# Patient Record
Sex: MALE | Race: OTHER | Employment: FULL TIME | URBAN - METROPOLITAN AREA
[De-identification: names, ages, dates, MRNs, and addresses within clinical notes are randomized per-mention and may not be internally consistent; named-entity substitution may affect disease eponyms.]

---

## 2024-07-08 ENCOUNTER — OFFICE VISIT (OUTPATIENT)
Dept: URGENT CARE | Facility: CLINIC | Age: 69
End: 2024-07-08
Payer: MEDICARE

## 2024-07-08 VITALS
OXYGEN SATURATION: 96 % | WEIGHT: 193 LBS | BODY MASS INDEX: 25.58 KG/M2 | HEART RATE: 78 BPM | DIASTOLIC BLOOD PRESSURE: 90 MMHG | HEIGHT: 73 IN | TEMPERATURE: 98 F | RESPIRATION RATE: 18 BRPM | SYSTOLIC BLOOD PRESSURE: 157 MMHG

## 2024-07-08 DIAGNOSIS — H53.8 BLURRED VISION: ICD-10-CM

## 2024-07-08 DIAGNOSIS — R42 VERTIGO: ICD-10-CM

## 2024-07-08 DIAGNOSIS — Z79.899 HIGH RISK MEDICATION USE: Primary | ICD-10-CM

## 2024-07-08 LAB — GLUCOSE SERPL-MCNC: 86 MG/DL (ref 70–110)

## 2024-07-08 PROCEDURE — 82962 GLUCOSE BLOOD TEST: CPT | Mod: S$GLB,,, | Performed by: FAMILY MEDICINE

## 2024-07-08 PROCEDURE — 99203 OFFICE O/P NEW LOW 30 MIN: CPT | Mod: S$GLB,,, | Performed by: FAMILY MEDICINE

## 2024-07-08 RX ORDER — METOPROLOL TARTRATE 50 MG/1
50 TABLET ORAL NIGHTLY
COMMUNITY
Start: 2024-05-17

## 2024-07-08 RX ORDER — MECLIZINE HYDROCHLORIDE 25 MG/1
25 TABLET ORAL 3 TIMES DAILY PRN
Qty: 30 TABLET | Refills: 0 | Status: SHIPPED | OUTPATIENT
Start: 2024-07-08

## 2024-07-08 RX ORDER — VALSARTAN AND HYDROCHLOROTHIAZIDE 160; 25 MG/1; MG/1
1 TABLET ORAL
COMMUNITY
Start: 2024-05-17

## 2024-07-08 RX ORDER — METHYLPREDNISOLONE 4 MG/1
TABLET ORAL
Qty: 21 EACH | Refills: 0 | Status: SHIPPED | OUTPATIENT
Start: 2024-07-08 | End: 2024-07-29

## 2024-07-08 NOTE — PROGRESS NOTES
Subjective:      Patient ID: Aly Brar is a 68 y.o. male.    Vitals:  vitals were not taken for this visit.     Chief Complaint: Tinnitus    This is a 68 y.o. male who presents today with a chief complaint of ear fullness onset Saturday. Pt complains of tingling sensation in ears, he states sound is muffled. Pt also is experiencing dizziness and loss of balance. Pt also states vision is blurry and headache in left eye. Pt was on a plane Saturday, when he got off the plane he felt ear fullness, but it never went away. Very slight ear pain. Left ear is worse than right.    Ringing in Ears:   Chronicity:  New  Onset:  In the past 7 days  Progression since onset:  Unchanged  Pain scale:  3/10  Severity:  Moderate  Duration: constant.  Ringing in ear characteristics:  Muffled   Associated symptoms: Dizziness, vertigo, headaches, tinnitus, buzzing, aural fullness and visual disturbances.  No otalgia and no facial weakness.  Aggravated by:  Noise  Treatments tried:  Nothing   PMH includes: Dizziness.  No ear surgery, no ear infections and no recent URI.    HENT:  Positive for tinnitus.    Neurological:  Positive for dizziness, history of vertigo and headaches.    Objective:     Physical Exam   Constitutional: He is oriented to person, place, and time. normal  HENT:   Head: Normocephalic and atraumatic.   Ears:   Right Ear: External ear and ear canal normal. Tympanic membrane is injected and erythematous.   Left Ear: Tympanic membrane, external ear and ear canal normal.   Nose: No rhinorrhea or congestion.   Mouth/Throat: No oropharyngeal exudate.   Pt with vertigo falling to the right room seems to spin      Comments: Pt with vertigo falling to the right room seems to spin  Eyes: Conjunctivae are normal. Pupils are equal, round, and reactive to light. Extraocular movement intact   Neck: Neck supple. No neck rigidity present.   Cardiovascular: Normal rate, regular rhythm and normal pulses.   No murmur  heard.  Pulmonary/Chest: Effort normal and breath sounds normal. No respiratory distress.   Abdominal: Normal appearance and bowel sounds are normal. Soft. flat abdomen   Musculoskeletal: Normal range of motion.         General: Normal range of motion.   Neurological: no focal deficit. He is alert, oriented to person, place, and time and at baseline.   Skin: Skin is warm and dry. Capillary refill takes less than 2 seconds.   Psychiatric: His behavior is normal. Mood, judgment and thought content normal.   Nursing note and vitals reviewed.  Positive reina pike on the right only    Assessment:     Plan:   1. High risk medication use  - POCT Glucose, Hand-Held Device    2. Blurred vision  - Visual acuity screening  - Ambulatory referral/consult to Ophthalmology    3. Vertigo  - meclizine (ANTIVERT) 25 mg tablet; Take 1 tablet (25 mg total) by mouth 3 (three) times daily as needed for Dizziness.  Dispense: 30 tablet; Refill: 0  - methylPREDNISolone (MEDROL DOSEPACK) 4 mg tablet; use as directed  Dispense: 21 each; Refill: 0

## 2024-07-08 NOTE — LETTER
July 8, 2024      [facility] [place]  [address] [address]  Phone: [phone]  Fax: [phone]       Patient: Aly Brar   YOB: 1955  Date of Visit: 07/08/2024    To Whom It May Concern:    Flora Brar  was at [facility] on 07/08/2024. The patient may return to work/school on 7/10/2024 with no restrictions. If you have any questions or concerns, or if I can be of further assistance, please do not hesitate to contact me.    Sincerely,    Pb Lozano MA

## 2024-07-10 ENCOUNTER — OFFICE VISIT (OUTPATIENT)
Dept: URGENT CARE | Facility: CLINIC | Age: 69
End: 2024-07-10
Payer: MEDICARE

## 2024-07-10 VITALS
TEMPERATURE: 98 F | OXYGEN SATURATION: 96 % | DIASTOLIC BLOOD PRESSURE: 101 MMHG | RESPIRATION RATE: 18 BRPM | HEIGHT: 73 IN | HEART RATE: 75 BPM | SYSTOLIC BLOOD PRESSURE: 156 MMHG | WEIGHT: 193 LBS | BODY MASS INDEX: 25.58 KG/M2

## 2024-07-10 DIAGNOSIS — M25.512 CHRONIC LEFT SHOULDER PAIN: Primary | ICD-10-CM

## 2024-07-10 DIAGNOSIS — Z75.8 DOES NOT HAVE PRIMARY CARE PROVIDER: ICD-10-CM

## 2024-07-10 DIAGNOSIS — R22.31 MASS OF FINGER OF RIGHT HAND: ICD-10-CM

## 2024-07-10 DIAGNOSIS — G89.29 CHRONIC LEFT SHOULDER PAIN: Primary | ICD-10-CM

## 2024-07-10 PROCEDURE — 96372 THER/PROPH/DIAG INJ SC/IM: CPT | Mod: S$GLB,,, | Performed by: FAMILY MEDICINE

## 2024-07-10 PROCEDURE — 99203 OFFICE O/P NEW LOW 30 MIN: CPT | Mod: 25,S$GLB,,

## 2024-07-10 RX ORDER — KETOROLAC TROMETHAMINE 30 MG/ML
30 INJECTION, SOLUTION INTRAMUSCULAR; INTRAVENOUS ONCE
Status: COMPLETED | OUTPATIENT
Start: 2024-07-10 | End: 2024-07-10

## 2024-07-10 RX ORDER — NAPROXEN 500 MG/1
500 TABLET ORAL 2 TIMES DAILY WITH MEALS
Qty: 10 TABLET | Refills: 0 | Status: SHIPPED | OUTPATIENT
Start: 2024-07-10 | End: 2024-07-15

## 2024-07-10 RX ORDER — MUPIROCIN 20 MG/G
OINTMENT TOPICAL 2 TIMES DAILY
Qty: 15 G | Refills: 0 | Status: SHIPPED | OUTPATIENT
Start: 2024-07-10

## 2024-07-10 RX ADMIN — KETOROLAC TROMETHAMINE 30 MG: 30 INJECTION, SOLUTION INTRAMUSCULAR; INTRAVENOUS at 09:07

## 2024-07-10 NOTE — PROGRESS NOTES
"Subjective:      Patient ID: Aly Brar is a 68 y.o. male.    Vitals:  height is 6' 1" (1.854 m) and weight is 87.5 kg (193 lb). His oral temperature is 97.5 °F (36.4 °C). His blood pressure is 156/101 (abnormal) and his pulse is 75. His respiration is 18 and oxygen saturation is 96%.     Chief Complaint: Shoulder Pain    68-year-old male patient presents of left shoulder pain ongoing for the last 3 months.  Patient states he has been taking Tylenol with no relief.  Patient denies any injury or trauma.  Patient states he does sleep on his left side at night.  Patient has full range of motion of shoulder joint.  Patient states pain sometimes shoots down his arm.  Patient also presents of a bump to the 3rd right digit ongoing for several months.  Patient states he has been picking on it and noticed some discharge.  Patient is requesting dermatologist referral to have bump removed.        Shoulder Pain   The pain is present in the left shoulder and left arm. This is a new problem. The pain is at a severity of 9/10. The pain is severe. Pertinent negatives include no fever, headaches, inability to bear weight, itching, joint locking, joint swelling, limited range of motion, numbness, stiffness, tingling or visual symptoms. He has tried nothing for the symptoms. Family history does not include arthritis. There is no history of diabetes, gout, Injuries to Extremity or migraines.       Constitution: Negative for activity change, appetite change, chills, sweating, fatigue and fever.   HENT:  Negative for ear pain, ear discharge, foreign body in ear, tinnitus, sinus pressure, sore throat, trouble swallowing and voice change.    Neck: Negative for neck pain, neck stiffness and painful lymph nodes.   Cardiovascular:  Negative for chest trauma, chest pain and leg swelling.   Eyes:  Negative for eye trauma, foreign body in eye and eye discharge.   Respiratory:  Negative for sleep apnea, chest tightness and cough.  "   Gastrointestinal:  Negative for abdominal trauma, abdominal pain, abdominal bloating and history of abdominal surgery.   Endocrine: hair loss and cold intolerance.   Genitourinary:  Negative for dysuria, frequency, urgency and urine decreased.   Musculoskeletal:  Negative for pain, trauma, joint pain, joint swelling, abnormal ROM of joint, arthritis, gout, back pain and pain with walking.   Skin:  Negative for color change, pale, rash, wound, abrasion, laceration, erythema, bruising, abscess and avulsion.        Small bump to 3rd digit   Allergic/Immunologic: Negative for environmental allergies, seasonal allergies and food allergies.   Neurological:  Negative for history of vertigo, light-headedness, headaches, disorientation, altered mental status and numbness.   Hematologic/Lymphatic: Negative for swollen lymph nodes, easy bruising/bleeding and trouble clotting. Does not bruise/bleed easily.   Psychiatric/Behavioral:  Negative for altered mental status, disorientation, confusion and agitation.       Objective:     Physical Exam   Constitutional: He is oriented to person, place, and time. He appears well-developed. He is cooperative. No distress.          Comments:Localized tenderness     HENT:   Head: Normocephalic and atraumatic.   Nose: Nose normal.   Mouth/Throat: Oropharynx is clear and moist and mucous membranes are normal.   Eyes: Conjunctivae and lids are normal.   Neck: Trachea normal and phonation normal. Neck supple.   Cardiovascular: Normal rate, regular rhythm, normal heart sounds and normal pulses.   Pulmonary/Chest: Effort normal and breath sounds normal.   Abdominal: Normal appearance and bowel sounds are normal. He exhibits no mass. Soft.   Musculoskeletal:         General: Tenderness present. No swelling, deformity or signs of injury.      Right lower leg: No edema.      Left lower leg: No edema.   Neurological: He is alert and oriented to person, place, and time. He has normal strength and  normal reflexes. No sensory deficit.   Skin: Skin is warm, dry, intact, not diaphoretic, not pale and no rash. No bruising, No erythema and No lesion jaundice  Psychiatric: His speech is normal and behavior is normal. Judgment and thought content normal.   Nursing note and vitals reviewed.      Assessment:     1. Chronic left shoulder pain    2. Mass of finger of right hand    3. Does not have primary care provider        Plan:       Chronic left shoulder pain  -     ketorolac injection 30 mg  -     naproxen (NAPROSYN) 500 MG tablet; Take 1 tablet (500 mg total) by mouth 2 (two) times daily with meals. for 5 days  Dispense: 10 tablet; Refill: 0  -     Ambulatory referral/consult to Orthopedics    Mass of finger of right hand  -     Ambulatory referral/consult to Dermatology  -     mupirocin (BACTROBAN) 2 % ointment; Apply topically 2 (two) times daily.  Dispense: 15 g; Refill: 0    Does not have primary care provider  -     Ambulatory referral/consult to Family Practice

## 2024-07-10 NOTE — PATIENT INSTRUCTIONS
Naproxen twice daily for 5 days.  Continue to wash lump in your finger with antibacterial soap and water and apply mupirocin ointment twice daily.  Please follow-up with dermatology to have it removed or re-evaluated.  Please follow up with Orthopedics regarding your chronic shoulder pain.  Please establish with new primary care doctor.    What can be done to prevent this health problem?   Stay active and work out to keep your muscles strong and flexible.  Warm up slowly and stretch your muscles before you work out. Do not work out if you are overly tired. Take extra care if working out in cold weather.  Slowly increase the amount of time you work out. If you are using weights, slowly increase the weight to strengthen your muscles.  Wear protection when playing sports.  Take breaks often when doing things that use repeat movements.  When do I need to call the doctor?   Pain or swelling gets worse  Hand feels cold or numb  You are not feeling better in 2 or 3 days or you are feeling worse  - Rest.    - Drink plenty of fluids.    - Acetaminophen (tylenol) or Ibuprofen (advil,motrin) as directed as needed for fever/pain. Avoid tylenol if you have a history of liver disease. Do not take ibuprofen if you have a history of GI bleeding, kidney disease, or if you take blood thinners.     - Follow up with your PCP or specialty clinic as directed in the next 1-2 weeks if not improved or as needed.  You can call [phone] to schedule an appointment with the appropriate provider.    - Go to the ER or seek medical attention immediately if you develop new or worsening symptoms.     - You must understand that you have received an Urgent Care treatment only and that you may be released before all of your medical problems are known or treated.   - You, the patient, will arrange for follow up care as instructed.   - If your condition worsens or fails to improve we recommend that you receive another evaluation at the ER  immediately or contact your PCP to discuss your concerns or return here.

## 2024-07-10 NOTE — LETTER
July 10, 2024      [facility] [place]  [address] [address]  Phone: [phone]  Fax: [phone]       Patient: Aly Brar   YOB: 1955  Date of Visit: 07/10/2024    To Whom It May Concern:    Flora Brar  was at [facility] on 07/10/2024. The patient may return to work on 7/11/24 with no restrictions. If you have any questions or concerns, or if I can be of further assistance, please do not hesitate to contact me.    Sincerely,    Mendel Reed NP

## 2024-07-11 ENCOUNTER — HOSPITAL ENCOUNTER (EMERGENCY)
Facility: HOSPITAL | Age: 69
Discharge: HOME | End: 2024-07-11
Attending: EMERGENCY MEDICINE
Payer: MEDICARE

## 2024-07-11 VITALS
TEMPERATURE: 97 F | DIASTOLIC BLOOD PRESSURE: 88 MMHG | WEIGHT: 193 LBS | RESPIRATION RATE: 17 BRPM | HEIGHT: 73 IN | BODY MASS INDEX: 25.58 KG/M2 | HEART RATE: 88 BPM | OXYGEN SATURATION: 99 % | SYSTOLIC BLOOD PRESSURE: 138 MMHG

## 2024-07-11 DIAGNOSIS — R42 DIZZINESS: Primary | ICD-10-CM

## 2024-07-11 LAB
ALBUMIN SERPL BCP-MCNC: 4.4 G/DL (ref 3.5–5.2)
ALP SERPL-CCNC: 56 U/L (ref 55–135)
ALT SERPL W/O P-5'-P-CCNC: 16 U/L (ref 10–44)
ANION GAP SERPL CALC-SCNC: 7 MMOL/L (ref 8–16)
AST SERPL-CCNC: 17 U/L (ref 10–40)
BASOPHILS # BLD AUTO: 0.01 K/UL (ref 0–0.2)
BASOPHILS NFR BLD: 0.3 % (ref 0–1.9)
BILIRUB SERPL-MCNC: 0.8 MG/DL (ref 0.1–1)
BUN SERPL-MCNC: 17 MG/DL (ref 8–23)
CALCIUM SERPL-MCNC: 9.7 MG/DL (ref 8.7–10.5)
CHLORIDE SERPL-SCNC: 107 MMOL/L (ref 95–110)
CO2 SERPL-SCNC: 30 MMOL/L (ref 23–29)
CREAT SERPL-MCNC: 0.9 MG/DL (ref 0.5–1.4)
DIFFERENTIAL METHOD BLD: ABNORMAL
EOSINOPHIL # BLD AUTO: 0 K/UL (ref 0–0.5)
EOSINOPHIL NFR BLD: 0.5 % (ref 0–8)
ERYTHROCYTE [DISTWIDTH] IN BLOOD BY AUTOMATED COUNT: 14.7 % (ref 11.5–14.5)
EST. GFR  (NO RACE VARIABLE): >60 ML/MIN/1.73 M^2
GLUCOSE SERPL-MCNC: 92 MG/DL (ref 70–110)
HCT VFR BLD AUTO: 42.7 % (ref 40–54)
HCV AB SERPL QL IA: NORMAL
HGB BLD-MCNC: 14.7 G/DL (ref 14–18)
HIV 1+2 AB+HIV1 P24 AG SERPL QL IA: NORMAL
IMM GRANULOCYTES # BLD AUTO: 0.11 K/UL (ref 0–0.04)
IMM GRANULOCYTES NFR BLD AUTO: 3 % (ref 0–0.5)
LYMPHOCYTES # BLD AUTO: 1.2 K/UL (ref 1–4.8)
LYMPHOCYTES NFR BLD: 33.2 % (ref 18–48)
MCH RBC QN AUTO: 31.5 PG (ref 27–31)
MCHC RBC AUTO-ENTMCNC: 34.4 G/DL (ref 32–36)
MCV RBC AUTO: 91 FL (ref 82–98)
MONOCYTES # BLD AUTO: 0.2 K/UL (ref 0.3–1)
MONOCYTES NFR BLD: 6.3 % (ref 4–15)
NEUTROPHILS # BLD AUTO: 2.1 K/UL (ref 1.8–7.7)
NEUTROPHILS NFR BLD: 56.7 % (ref 38–73)
NRBC BLD-RTO: 0 /100 WBC
PLATELET # BLD AUTO: 206 K/UL (ref 150–450)
PMV BLD AUTO: 9.4 FL (ref 9.2–12.9)
POTASSIUM SERPL-SCNC: 3.3 MMOL/L (ref 3.5–5.1)
PROT SERPL-MCNC: 7.7 G/DL (ref 6–8.4)
RBC # BLD AUTO: 4.67 M/UL (ref 4.6–6.2)
SARS-COV-2 RDRP RESP QL NAA+PROBE: NEGATIVE
SODIUM SERPL-SCNC: 144 MMOL/L (ref 136–145)
WBC # BLD AUTO: 3.65 K/UL (ref 3.9–12.7)

## 2024-07-11 PROCEDURE — 99285 EMERGENCY DEPT VISIT HI MDM: CPT | Mod: 25

## 2024-07-11 PROCEDURE — U0002 COVID-19 LAB TEST NON-CDC: HCPCS | Performed by: EMERGENCY MEDICINE

## 2024-07-11 PROCEDURE — 86803 HEPATITIS C AB TEST: CPT | Performed by: PHYSICIAN ASSISTANT

## 2024-07-11 PROCEDURE — 85025 COMPLETE CBC W/AUTO DIFF WBC: CPT | Performed by: EMERGENCY MEDICINE

## 2024-07-11 PROCEDURE — 87389 HIV-1 AG W/HIV-1&-2 AB AG IA: CPT | Performed by: PHYSICIAN ASSISTANT

## 2024-07-11 PROCEDURE — 80053 COMPREHEN METABOLIC PANEL: CPT | Performed by: EMERGENCY MEDICINE

## 2024-07-11 NOTE — ED TRIAGE NOTES
Aly Brar, a 68 y.o. male presents to the ED w/ complaint of dizziness, states that vertigo is getting worse    Triage note:  Chief Complaint   Patient presents with    Headache    Dizziness     X 4 days     Otalgia     Bilateral ear fullness and pain, left worst than right      Review of patient's allergies indicates:   Allergen Reactions    Amoxicillin     Bactrim [sulfamethoxazole-trimethoprim]     Sulfa (sulfonamide antibiotics)      No past medical history on file.      APPEARANCE: awake and alert in NAD. PAIN  0/10  SKIN: warm, dry and intact. No breakdown or bruising.  MUSCULOSKELETAL: Patient moving all extremities spontaneously, no obvious swelling or deformities noted. Ambulates independently.  RESPIRATORY: Denies shortness of breath.Respirations unlabored.   CARDIAC: Denies CP, 2+ distal pulses; no peripheral edema  ABDOMEN: S/ND/NT, Denies nausea  : voids spontaneously, denies difficulty  Neurologic: AAO x 4; follows commands equal strength in all extremities; denies numbness/tingling. endorses dizziness

## 2024-07-11 NOTE — ED PROVIDER NOTES
Encounter Date: 7/11/2024       History     Chief Complaint   Patient presents with    Headache    Dizziness     X 4 days     Otalgia     Bilateral ear fullness and pain, left worst than right      68-year-old male, history of hypertension, complaining of dizziness since Saturday, 5 days ago.  Patient 1st noticed symptoms when he was walking off of the plane on Saturday.  He felt like his hearing had become diminished in both of his ears and he was feeling off balance and having difficulty walking.  The hearing symptoms slowly subsided but over the last few days the dizziness has worsened, he is persistently having difficulty walking and he is feeling a heaviness in his head as well as worsening blurry vision bilaterally.  He has had no focal weakness or numbness.  He has had no neck pain, no nausea or vomiting.  He was seen in urgent care 3 days ago and yesterday for these symptoms.    The history is provided by the patient.     Review of patient's allergies indicates:   Allergen Reactions    Amoxicillin     Bactrim [sulfamethoxazole-trimethoprim]     Sulfa (sulfonamide antibiotics)      History reviewed. No pertinent past medical history.  History reviewed. No pertinent surgical history.  No family history on file.  Social History     Tobacco Use    Smoking status: Never    Smokeless tobacco: Never   Substance Use Topics    Alcohol use: Not Currently    Drug use: Never     Review of Systems    Physical Exam     Initial Vitals [07/11/24 1037]   BP Pulse Resp Temp SpO2   (!) 141/101 97 16 99 °F (37.2 °C) 97 %      MAP       --         Physical Exam    Nursing note and vitals reviewed.  Constitutional: Vital signs are normal. He appears well-developed and well-nourished. He is not diaphoretic.  Non-toxic appearance. He does not appear ill. No distress.   HENT:   Head: Normocephalic and atraumatic.   Mouth/Throat: Mucous membranes are normal. Mucous membranes are not dry.   Eyes: Conjunctivae and lids are normal.    Neck: Neck supple.   Normal range of motion.  Cardiovascular:  Normal rate.           Pulmonary/Chest: No respiratory distress.   Musculoskeletal:      Cervical back: Normal range of motion and neck supple.     Neurological: He is alert and oriented to person, place, and time. He has normal strength. No cranial nerve deficit or sensory deficit. Coordination and gait abnormal. GCS score is 15. GCS eye subscore is 4. GCS verbal subscore is 5. GCS motor subscore is 6.   Some mild dysmetria in the left upper extremity.  Patient has a limp at baseline but his gait is slightly more unsteady than he reports that he normally has.    Speech no aphasia or dysarthria    No abnormal nystagmus   Skin: Skin is dry and intact. No pallor.   Psychiatric: He has a normal mood and affect. His speech is normal and behavior is normal.         ED Course   Procedures  Labs Reviewed   COMPREHENSIVE METABOLIC PANEL - Abnormal; Notable for the following components:       Result Value    Potassium 3.3 (*)     CO2 30 (*)     Anion Gap 7 (*)     All other components within normal limits   CBC W/ AUTO DIFFERENTIAL - Abnormal; Notable for the following components:    WBC 3.65 (*)     MCH 31.5 (*)     RDW 14.7 (*)     Immature Granulocytes 3.0 (*)     Immature Grans (Abs) 0.11 (*)     Mono # 0.2 (*)     All other components within normal limits   HIV 1 / 2 ANTIBODY    Narrative:     Release to patient->Immediate   HEPATITIS C ANTIBODY    Narrative:     Release to patient->Immediate   SARS-COV-2 RNA AMPLIFICATION, QUAL          Imaging Results              MRI Brain Without Contrast (Final result)  Result time 07/11/24 15:32:17      Final result by Pancho Nova MD (07/11/24 15:32:17)                   Impression:      No evidence of acute infarct or hemorrhage.    Mild chronic small vessel ischemic change and generalized cerebral volume loss.    Electronically signed by resident: William  Nino  Date:    07/11/2024  Time:    15:18    Electronically signed by: Pancho Nova MD  Date:    07/11/2024  Time:    15:32               Narrative:    EXAMINATION:  MRI BRAIN WITHOUT CONTRAST    CLINICAL HISTORY:  Dizziness, persistent/recurrent, cardiac or vascular cause suspected;    TECHNIQUE:  Multiplanar multisequence MR imaging of the brain was performed without the administration of intravenous contrast.    COMPARISON:  CT head same-date.    FINDINGS:  Prominence of the ventricles and sulci compatible with mild generalized cerebral volume loss.  No hydrocephalus.    Mild patchy T2/FLAIR hyperintensity in the supratentorial white matter, nonspecific but most likely reflecting chronic microvascular ischemic changes. No recent or remote major vascular distribution infarct. No recent or remote hemorrhage.  No mass effect or midline shift.    No extra-axial blood or fluid collections.    The T2 skull base flow voids are preserved.  Bone marrow signal intensity unremarkable.    Small bilateral mastoid fluid.  Trace mucosal thickening ethmoidal air cells.                                       CT Head Without Contrast (Final result)  Result time 07/11/24 13:41:47      Final result by Kit Cabral DO (07/11/24 13:41:47)                   Impression:      Unremarkable slightly motion distorted noncontrast CT head as detailed above specifically without evidence for definite acute intracranial hemorrhage or hydrocephalus.    Clinical correlation and further evaluation as warranted clinically.    Electronically signed by resident: Quoc Calixto  Date:    07/11/2024  Time:    13:20    Electronically signed by: Kit Cabral DO  Date:    07/11/2024  Time:    13:41               Narrative:    EXAMINATION:  CT HEAD WITHOUT CONTRAST    CLINICAL HISTORY:  Headache, new or worsening (Age >= 50y);    TECHNIQUE:  Low dose axial images were obtained through the head.  Coronal and sagittal reformations were also performed.  Contrast was not administered.    COMPARISON:  None.    FINDINGS:  Study slightly distorted by patient motion artifacts.  Mild cerebral volume loss.  Ventricles normal in size without hydrocephalus.  There is ill-defined decreased attenuation supratentorial white matter while nonspecific concerning for sequela of chronic ischemic change.  Mild patchy mucosal thickening.  Punctate basal gangliar calcifications incidentally identified                                       Medications - No data to display  Medical Decision Making  Dizziness seems most c/w vertigo syndrome.  Peripheral vs central vertigo syndromes considered.  DDx for peripheral vertigo would include BPPV, vestibular neuritis, labrynthitis, meniere's disease.  For central process, posterior circulation stroke (ischemic vs hemorrhagic) or CNS mass would be greatest concern.      Given age, history of hypertension, worsening symptoms and abnormal neuro exam, would want to rule out central process.    Plan:  -labs  -CT head and if negative, MRI brain        Amount and/or Complexity of Data Reviewed  Labs: ordered. Decision-making details documented in ED Course.  Radiology: ordered.              Attending Attestation:             Attending ED Notes:   ED workup negative for any central pathology.  Labs normal as well.  Unclear etiology of patient's symptoms but could be related to pressure changes that occurred on descent during his airplane travel on Sunday.  Suspect that symptoms will subside with more time.  Patient updated on all the results, comfortable with plan for discharge home.      ED Course as of 07/11/24 1602   Thu Jul 11, 2024   1257 WBC(!): 3.65 [AS]      ED Course User Index  [AS] Kathie Chamberlain MD                           Clinical Impression:  Final diagnoses:  [R42] Dizziness (Primary)          ED Disposition Condition    Discharge Stable          ED Prescriptions    None       Follow-up Information       Follow up With Specialties  Details Why Contact [facility] Emergency Dept Emergency Medicine  If symptoms worsen [address]  [facility] [id number] [phone]             Kathie Chamberlain MD  07/11/24 4542

## 2024-07-11 NOTE — Clinical Note
"Aly Lemus" Maykel was seen and treated in our emergency department on 7/11/2024.  He may return to work on 07/12/2024.       If you have any questions or concerns, please don't hesitate to call.      Yoni Grady RN    "

## 2024-07-23 ENCOUNTER — LAB VISIT (OUTPATIENT)
Dept: LAB | Facility: HOSPITAL | Age: 69
End: 2024-07-23
Payer: MEDICARE

## 2024-07-23 ENCOUNTER — OFFICE VISIT (OUTPATIENT)
Dept: INTERNAL MEDICINE | Facility: CLINIC | Age: 69
End: 2024-07-23
Payer: MEDICARE

## 2024-07-23 VITALS
BODY MASS INDEX: 24.69 KG/M2 | DIASTOLIC BLOOD PRESSURE: 97 MMHG | HEIGHT: 73 IN | WEIGHT: 186.31 LBS | HEART RATE: 62 BPM | SYSTOLIC BLOOD PRESSURE: 148 MMHG

## 2024-07-23 DIAGNOSIS — Z00.00 ANNUAL PHYSICAL EXAM: Primary | ICD-10-CM

## 2024-07-23 DIAGNOSIS — I20.9 ANGINA PECTORIS: ICD-10-CM

## 2024-07-23 DIAGNOSIS — I10 HYPERTENSION, UNSPECIFIED TYPE: ICD-10-CM

## 2024-07-23 DIAGNOSIS — R42 VERTIGO: ICD-10-CM

## 2024-07-23 DIAGNOSIS — R79.9 ABNORMAL FINDING OF BLOOD CHEMISTRY, UNSPECIFIED: ICD-10-CM

## 2024-07-23 DIAGNOSIS — Z12.5 ENCOUNTER FOR SCREENING FOR MALIGNANT NEOPLASM OF PROSTATE: ICD-10-CM

## 2024-07-23 DIAGNOSIS — Z85.46 HISTORY OF PROSTATE CANCER: ICD-10-CM

## 2024-07-23 DIAGNOSIS — Z00.00 ANNUAL PHYSICAL EXAM: ICD-10-CM

## 2024-07-23 LAB
ANION GAP SERPL CALC-SCNC: 7 MMOL/L (ref 8–16)
BUN SERPL-MCNC: 13 MG/DL (ref 8–23)
CALCIUM SERPL-MCNC: 9.2 MG/DL (ref 8.7–10.5)
CHLORIDE SERPL-SCNC: 105 MMOL/L (ref 95–110)
CHOLEST SERPL-MCNC: 169 MG/DL (ref 120–199)
CHOLEST/HDLC SERPL: 2.6 {RATIO} (ref 2–5)
CO2 SERPL-SCNC: 31 MMOL/L (ref 23–29)
COMPLEXED PSA SERPL-MCNC: 0.9 NG/ML (ref 0–4)
CREAT SERPL-MCNC: 0.9 MG/DL (ref 0.5–1.4)
EST. GFR  (NO RACE VARIABLE): >60 ML/MIN/1.73 M^2
ESTIMATED AVG GLUCOSE: 108 MG/DL (ref 68–131)
GLUCOSE SERPL-MCNC: 85 MG/DL (ref 70–110)
HBA1C MFR BLD: 5.4 % (ref 4–5.6)
HDLC SERPL-MCNC: 65 MG/DL (ref 40–75)
HDLC SERPL: 38.5 % (ref 20–50)
LDLC SERPL CALC-MCNC: 91.8 MG/DL (ref 63–159)
NONHDLC SERPL-MCNC: 104 MG/DL
POTASSIUM SERPL-SCNC: 2.9 MMOL/L (ref 3.5–5.1)
SODIUM SERPL-SCNC: 143 MMOL/L (ref 136–145)
TRIGL SERPL-MCNC: 61 MG/DL (ref 30–150)

## 2024-07-23 PROCEDURE — 80048 BASIC METABOLIC PNL TOTAL CA: CPT

## 2024-07-23 PROCEDURE — 36415 COLL VENOUS BLD VENIPUNCTURE: CPT

## 2024-07-23 PROCEDURE — 83036 HEMOGLOBIN GLYCOSYLATED A1C: CPT

## 2024-07-23 PROCEDURE — 84153 ASSAY OF PSA TOTAL: CPT

## 2024-07-23 PROCEDURE — 80061 LIPID PANEL: CPT

## 2024-07-23 PROCEDURE — 99214 OFFICE O/P EST MOD 30 MIN: CPT | Mod: PBBFAC

## 2024-07-23 PROCEDURE — 99999 PR PBB SHADOW E&M-EST. PATIENT-LVL IV: CPT | Mod: PBBFAC,GC,,

## 2024-07-23 RX ORDER — VALSARTAN 160 MG/1
1 TABLET ORAL DAILY
COMMUNITY
Start: 2023-07-27 | End: 2024-07-23 | Stop reason: SDUPTHER

## 2024-07-23 RX ORDER — OMEPRAZOLE 40 MG/1
40 CAPSULE, DELAYED RELEASE ORAL EVERY MORNING
COMMUNITY
Start: 2023-08-31 | End: 2024-07-23

## 2024-07-23 RX ORDER — VALSARTAN 160 MG/1
160 TABLET ORAL DAILY
Qty: 90 TABLET | Refills: 3 | Status: SHIPPED | OUTPATIENT
Start: 2024-07-23

## 2024-07-23 NOTE — PROGRESS NOTES
[facility]  CLINIC NOTE  Patient Name: Aly Brar  YOB: 1955  Chief Complaint: Establish care    PRESENTING HISTORY       History of Present Illness:  Mr. Aly Brar is a 68 y.o. male w/ history of HTN, LHC (in 2023, no evidence of obstructive CAD), Hx of prostate cancer (2015, radiation therapy) , GERD, and Angina presenting to establish care. Patient reports that he was previously seeing a PCP in [place], [place]. Patient reports he is doing well and has no concerns or complaints. He was recently in the ED due to reported Vertigo. He reports since his ED visit, his symptoms have resolved. He does not have any dizziness, lightheadedness, syncope, and Nausea/vomiting symptoms. Patient was given Meclizine which he reports helped improve his symptoms. CT and MRI ruled out central causes of vertigo. Patient reported taking two medications Valsartan- HCTZ combo pill in addition to Lopressor 25 mg nightly. Patient reports excessive urination. He denies dysuria, hematuria, and trouble urinating. He reports that his previous PCP and him agreed that he will be on Valsartan Monotherapy without HCTZ but when he recently filled his medications he was given the combo pill. Patient does not check his BP at home. We discussed importance of adhering to his medication regimen as well (because he did not take it today), in addition to checking his BP.       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 Reports that he was told he doesn't need it anymore. Discussed retrieving records from previous physician.   - Flu vaccine: Patient reports having it   - COVID vaccine: UTD, discussed  retrieving booster at [facility] or [facility]   - Shingles vaccine: Discussed having shingles vaccination   - Pneumococcal vaccine: Reports having it in [place], [place]. Discussed with patient retrieving records    Surgical History: Umbilical Hernia repair in 1955, Polio in 1955 had multiple surgeries associated with it (does not remember which), Radiation therapy for prostate in .  Fhx:  Father: Stomach cancer ( at 59 years old)  Mother: Diabetes    ROS    PAST HISTORY:   No past medical history on file.    No past surgical history on file.    No family history on file.    Social History     Socioeconomic History    Marital status: Single   Tobacco Use    Smoking status: Never    Smokeless tobacco: Never   Substance and Sexual Activity    Alcohol use: Not Currently    Drug use: Never    Sexual activity: Not Currently     Social Determinants of Health     Financial Resource Strain: Low Risk  (2024)    Overall Financial Resource Strain (CARDIA)     Difficulty of Paying Living Expenses: Not hard at all   Food Insecurity: No Food Insecurity (2024)    Hunger Vital Sign     Worried About Running Out of Food in the Last Year: Never true     Ran Out of Food in the Last Year: Never true   Physical Activity: Unknown (2024)    Exercise Vital Sign     Days of Exercise per Week: 3 days   Stress: No Stress Concern Present (2024)    [employer] [place] [employer] - Occupational Stress Questionnaire     Feeling of Stress : Not at all   Housing Stability: Unknown (2024)    Housing Stability Vital Sign     Unable to Pay for Housing in the Last Year: No       MEDICATIONS & ALLERGIES:     Current Outpatient Medications on File Prior to Visit   Medication Sig    [DISCONTINUED] omeprazole (PRILOSEC) 40 MG capsule Take 40 mg by mouth every morning.    [DISCONTINUED] valsartan (DIOVAN) 160 MG tablet Take 1 tablet by mouth once daily.    meclizine (ANTIVERT) 25 mg tablet Take 1 tablet  "(25 mg total) by mouth 3 (three) times daily as needed for Dizziness.    metoprolol tartrate (LOPRESSOR) 50 MG tablet Take 25 mg by mouth every evening.    mupirocin (BACTROBAN) 2 % ointment Apply topically 2 (two) times daily.    [DISCONTINUED] methylPREDNISolone (MEDROL DOSEPACK) 4 mg tablet use as directed    [DISCONTINUED] valsartan-hydrochlorothiazide (DIOVAN-HCT) 160-25 mg per tablet Take 1 tablet by mouth.     No current facility-administered medications on file prior to visit.       Review of patient's allergies indicates:   Allergen Reactions    Cefazolin Swelling    Amoxicillin     Bactrim [sulfamethoxazole-trimethoprim]     Sulfa (sulfonamide antibiotics)        OBJECTIVE:   Vital Signs:  Vitals:    07/23/24 1350   BP: (!) 148/97   Pulse: 62   Weight: 84.5 kg (186 lb 4.6 oz)   Height: 6' 1" (1.854 m)        Physical Exam  Constitutional:       Appearance: Normal appearance.   HENT:      Head: Normocephalic and atraumatic.   Cardiovascular:      Rate and Rhythm: Normal rate and regular rhythm.      Pulses: Normal pulses.      Heart sounds: Normal heart sounds.   Pulmonary:      Effort: Pulmonary effort is normal.      Breath sounds: Normal breath sounds.   Abdominal:      General: Bowel sounds are normal. There is no distension.      Palpations: Abdomen is soft.      Tenderness: There is no abdominal tenderness.   Neurological:      Mental Status: He is alert and oriented to person, place, and time.       ASSESSMENT & PLAN:     Aly was seen today for establish care.    Diagnoses and all orders for this visit:    Annual physical exam  -     LIPID PANEL; Future  -     HEMOGLOBIN A1C; Future  -     PSA, SCREENING; Future  -     BASIC METABOLIC PANEL; Future    Hypertension, unspecified type  -     HEMOGLOBIN A1C; Future  -     valsartan (DIOVAN) 160 MG tablet; Take 1 tablet (160 mg total) by mouth once daily.    Vertigo    Angina pectoris  -     LIPID PANEL; Future  -     HEMOGLOBIN A1C; " Future    History of prostate cancer  -     PSA, SCREENING; Future  -     Ambulatory referral/consult to Urology; Future    Abnormal finding of blood chemistry, unspecified  -     HEMOGLOBIN A1C; Future    Encounter for screening for malignant neoplasm of prostate  -     PSA, SCREENING; Future    Discussed with patient that we will discontinue Valsartan-HCTZ combination pill and continue with Valsartan monotherapy. Discussed with him keeping a BP log due to elevated reading today in clinic. Also emphasized adhering to medication regimen. If his BP remains elevated on Valsartan monotherapy with adherence he may need an additional anti hypertensive. He verbalized understanding.    We will obtain a PSA, he was told by his previous urologist that he will need annual visits due to history of prostate cancer. Will refer to urology as well to establish with them due to his history.     Lipid, A1c, BMP will be obtained as well. Will call patient to discuss results.     Will continue Lopressor 25 mg QHS for reported angina and HTN. Will reassess need at a later time.     Discussed with patient importance of sending us his medical records including vaccinations. He verbalized he will attempt to obtain them for the next visit.     Health Maintenance         Date Due Completion Date    Lipid Panel Never done ---    Pneumococcal Vaccines (Age 65+) (1 of 2 - PCV) Never done ---    TETANUS VACCINE Never done ---    Hemoglobin A1c (Diabetic Prevention Screening) Never done ---    Colorectal Cancer Screening Never done ---    Shingles Vaccine (1 of 2) Never done ---    RSV Vaccine (Age 60+ and Pregnant patients) (1 - 1-dose 60+ series) Never done ---    COVID-19 Vaccine (2 - 2023-24 season) 09/01/2023 10/14/2022    Influenza Vaccine (1) 09/01/2024 10/14/2022            Discussed with Dr. Mar - staff attestation to follow    RTC 6 weeks     Gabriel Patiño DO  Internal Medicine PGY-2  [facility]  [address] [place] [address]  [place] [address]

## 2024-07-23 NOTE — PATIENT INSTRUCTIONS
Thank you for presenting to our clinic today! As we discussed, please keep a log of your BP and bring the log with you at the next visit. I sent over the Valsartan to your pharmacy of choice. Attached to this paperwork is the DASH diet, it is a diet recommended for people who have documented HTN

## 2024-07-24 DIAGNOSIS — E87.6 HYPOKALEMIA: Primary | ICD-10-CM

## 2024-07-24 RX ORDER — POTASSIUM CHLORIDE 750 MG/1
20 CAPSULE, EXTENDED RELEASE ORAL DAILY
Qty: 14 CAPSULE | Refills: 0 | Status: SHIPPED | OUTPATIENT
Start: 2024-07-24 | End: 2024-07-31

## 2024-07-25 ENCOUNTER — PATIENT MESSAGE (OUTPATIENT)
Dept: SPORTS MEDICINE | Facility: CLINIC | Age: 69
End: 2024-07-25
Payer: MEDICARE

## 2024-07-25 ENCOUNTER — TELEPHONE (OUTPATIENT)
Dept: SPORTS MEDICINE | Facility: CLINIC | Age: 69
End: 2024-07-25
Payer: MEDICARE

## 2024-07-29 ENCOUNTER — TELEPHONE (OUTPATIENT)
Dept: UROLOGY | Facility: CLINIC | Age: 69
End: 2024-07-29
Payer: MEDICARE

## 2024-07-30 ENCOUNTER — OFFICE VISIT (OUTPATIENT)
Dept: SPORTS MEDICINE | Facility: CLINIC | Age: 69
End: 2024-07-30
Payer: MEDICARE

## 2024-07-30 ENCOUNTER — HOSPITAL ENCOUNTER (OUTPATIENT)
Dept: RADIOLOGY | Facility: HOSPITAL | Age: 69
Discharge: HOME | End: 2024-07-30
Attending: PHYSICIAN ASSISTANT
Payer: MEDICARE

## 2024-07-30 VITALS
WEIGHT: 187.38 LBS | HEIGHT: 73 IN | BODY MASS INDEX: 24.83 KG/M2 | HEART RATE: 90 BPM | DIASTOLIC BLOOD PRESSURE: 95 MMHG | SYSTOLIC BLOOD PRESSURE: 149 MMHG

## 2024-07-30 DIAGNOSIS — G89.29 CHRONIC LEFT SHOULDER PAIN: ICD-10-CM

## 2024-07-30 DIAGNOSIS — M25.512 CHRONIC LEFT SHOULDER PAIN: Primary | ICD-10-CM

## 2024-07-30 DIAGNOSIS — M25.512 CHRONIC LEFT SHOULDER PAIN: ICD-10-CM

## 2024-07-30 DIAGNOSIS — G89.29 CHRONIC LEFT SHOULDER PAIN: Primary | ICD-10-CM

## 2024-07-30 DIAGNOSIS — M19.012 PRIMARY OSTEOARTHRITIS OF LEFT SHOULDER: ICD-10-CM

## 2024-07-30 PROCEDURE — 20610 DRAIN/INJ JOINT/BURSA W/O US: CPT | Mod: PBBFAC,LT | Performed by: PHYSICIAN ASSISTANT

## 2024-07-30 PROCEDURE — 20610 DRAIN/INJ JOINT/BURSA W/O US: CPT | Mod: S$PBB,LT,, | Performed by: PHYSICIAN ASSISTANT

## 2024-07-30 PROCEDURE — 99213 OFFICE O/P EST LOW 20 MIN: CPT | Mod: PBBFAC,25 | Performed by: PHYSICIAN ASSISTANT

## 2024-07-30 PROCEDURE — 73030 X-RAY EXAM OF SHOULDER: CPT | Mod: 26,LT,, | Performed by: INTERNAL MEDICINE

## 2024-07-30 PROCEDURE — 99999 PR PBB SHADOW E&M-EST. PATIENT-LVL III: CPT | Mod: PBBFAC,,, | Performed by: PHYSICIAN ASSISTANT

## 2024-07-30 PROCEDURE — 73030 X-RAY EXAM OF SHOULDER: CPT | Mod: TC,LT

## 2024-07-30 PROCEDURE — 99999PBSHW PR PBB SHADOW TECHNICAL ONLY FILED TO HB: Mod: PBBFAC,,,

## 2024-07-30 PROCEDURE — 99214 OFFICE O/P EST MOD 30 MIN: CPT | Mod: 25,S$PBB,, | Performed by: PHYSICIAN ASSISTANT

## 2024-07-30 RX ORDER — TRIAMCINOLONE ACETONIDE 40 MG/ML
40 INJECTION, SUSPENSION INTRA-ARTICULAR; INTRAMUSCULAR
Status: DISCONTINUED | OUTPATIENT
Start: 2024-07-30 | End: 2024-07-30 | Stop reason: HOSPADM

## 2024-07-30 RX ADMIN — TRIAMCINOLONE ACETONIDE 40 MG: 40 INJECTION, SUSPENSION INTRA-ARTICULAR; INTRAMUSCULAR at 10:07

## 2024-07-30 NOTE — PROGRESS NOTES
CC: LEFT shoulder pain    Patient is a 68-year-old male who presents today for initial evaluation of left shoulder pain.  Referred to me after being seen at an urgent care for this issue.  Pain has been present since February.  He denies any inciting injury or trauma to the left shoulder.  Symptoms seem to wax and wane, however seems to have been worsening over the past month or so.  He points to and localizes the pain to the posterior joint line and the axillary area.  He denies any significant weakness.  He is right-hand dominant.  He does note some occasional paresthesias of the left upper extremity when holding his arm up when driving or when sleeping on his left side.  He does make mention of some baseline bilateral upper and lower extremity peripheral neuropathy.  Symptoms seem to improve with rest and over-the-counter relief.  Worse with movement and sleeping on the left side.  No prior injuries or surgeries on the left shoulder.    Is affecting ADLs.  Pain is 5/10 at it's worst.      History reviewed. No pertinent past medical history.    History reviewed. No pertinent surgical history.    No family history on file.      Current Outpatient Medications:     metoprolol tartrate (LOPRESSOR) 50 MG tablet, Take 25 mg by mouth every evening., Disp: , Rfl:     mupirocin (BACTROBAN) 2 % ointment, Apply topically 2 (two) times daily., Disp: 15 g, Rfl: 0    potassium chloride (MICRO-K) 10 MEQ CpSR, Take 2 capsules (20 mEq total) by mouth once daily. for 7 days, Disp: 14 capsule, Rfl: 0    valsartan (DIOVAN) 160 MG tablet, Take 1 tablet (160 mg total) by mouth once daily., Disp: 90 tablet, Rfl: 3    Review of patient's allergies indicates:   Allergen Reactions    Cefazolin Swelling    Amoxicillin     Bactrim [sulfamethoxazole-trimethoprim]     Sulfa (sulfonamide antibiotics)           REVIEW OF SYSTEMS:  Constitution: Negative. Negative for chills, fever and night sweats.   HENT: Negative for congestion and headaches.   "  Eyes: Negative for blurred vision, left vision loss and right vision loss.   Cardiovascular: Negative for chest pain and syncope.   Respiratory: Negative for cough and shortness of breath.    Endocrine: Negative for polydipsia, polyphagia and polyuria.   Hematologic/Lymphatic: Negative for bleeding problem. Does not bruise/bleed easily.   Skin: Negative for dry skin, itching and rash.   Musculoskeletal: Negative for falls.  Positive for left shoulder pain and muscle weakness.   Gastrointestinal: Negative for abdominal pain and bowel incontinence.   Genitourinary: Negative for bladder incontinence and nocturia.   Neurological: Negative for disturbances in coordination, loss of balance and seizures.   Psychiatric/Behavioral: Negative for depression. The patient does not have insomnia.    Allergic/Immunologic: Negative for hives and persistent infections.      PHYSICAL EXAMINATION:  Vitals:  BP (!) 149/95   Pulse 90   Ht 6' 1" (1.854 m)   Wt 85 kg (187 lb 6.3 oz)   BMI 24.72 kg/m²    General: The patient is alert and oriented x 3.  Mood is pleasant.  Observation of ears, eyes and nose reveal no gross abnormalities.  No labored breathing observed.  Gait is coordinated. Patient can toe walk and heel walk without difficulty.      LEFT Shoulder / Upper Extremity Exam    OBSERVATION:     Swelling  none  Deformity  none   Discoloration  none   Scapular winging none   Scars   none  Atrophy  none    TENDERNESS / CREPITUS (T/C):          T/C      T/C   Clavicle   -/-  SUPRAspinatus    -/-     AC Jt.    -/-  INFRAspinatus  -/-    SC Jt.    -/-  Deltoid    -/-      G. Tuberosity  -/-  LH BICEP groove  -/-   Acromion:  -/-  Midline Neck   -/-     Scapular Spine -/-  Trapezium   -/-   SMA Scapula  -/-  GH jt. line - post  +/-     Scapulothoracic  -/-  Pectoralis tendon  +/-        ROM: (* = with pain)  Right shoulder   Left shoulder        AROM (PROM)   AROM (PROM)   FE    170° (175°)     170° (175°)     ER at 0°    60°  " (65°)    60°  (65°)   ER at 90° ABD  80°  (80°)    80°  (80°)   IR at 90°  ABD   NA  (40°)     70  (70°)      IR (spine level)   T10     T10    STRENGTH: (* = with pain) Right shoulder   Left shoulder    SCAPTION   5/5    5/5*    IR    5/5    5/5   ER    5/5    5/5   BICEPS   5/5    5/5   Deltoid    5/5    5/5     SIGNS:  Painful side       NEER   -    OAMINAS  neg    FLORES   +    SPEEDS  neg     DROP ARM   -   BELLY PRESS neg   Superior escape none    LIFT-OFF  neg   X-Body ADD    neg    MOVING VALGUS neg    Gabrielle's   +       STABILITY TESTING    Right shoulder   Left shoulder    Translation     Anterior  up face     up face    Posterior  up face    up face    Sulcus   < 10mm    < 10 mm     Signs   Apprehension   neg      neg       Relocation   no change     no change      Jerk test  neg     neg    EXTREMITY NEURO-VASCULAR EXAM:    Sensation grossly intact to light touch all dermatomal regions.    DTR 2+ Biceps, Triceps, BR and Negative Cecilias sign   Grossly intact motor function at Elbow, Wrist and Hand   Distal pulses radial and ulnar 2+, brisk cap refill, symmetric.      NECK:  Painless FROM and spinous processes non-tender. Negative Spurlings sign.      OTHER FINDINGS:  - scapular dyskinesia    XRAYS left shoulder (7/30/2024):  Xrays including AP, Outlet and Axillary Lateral of Left shoulder are ordered / images reviewed by me:  No acute fracture or dislocation.  Moderate glenohumeral and acromioclavicular joint arthritis with associated joint space narrowing, subchondral cysts, and osteophyte formation.  Soft tissues appear normal.        ASSESSMENT:     Left shoulder pain  Osteoarthritis of left shoulder      PLAN:      I made the decision to obtain old records of the patient including previous notes and imaging. New imaging was ordered today of the extremity or extremities evaluated. I independently reviewed and interpreted the radiographs and/or MRIs today as well as prior imaging, if  available.    We discussed at length different treatment options including conservative vs surgical management. These include anti-inflammatories, acetaminophen, rest, ice, heat, formal physical therapy including strengthening and stretching exercises, home exercise programs, dry needling, corticosteroid injections, and finally surgical intervention.      Left shoulder subacromial CSI performed today.  See procedure note for details.    Recommend he continue to rest and ice the left shoulder and take over-the-counter Aleve and alternation with acetaminophen as needed for pain.    Follow up in approximately 3 months.    All questions were answered, patient will contact us for questions or concerns in the interim.      Medical Dictation software was used during the dictation of portions or the entirety of this medical record.  Phonetic or grammatic errors may exist due to the use of this software. For clarification, refer to the author of the document.

## 2024-07-30 NOTE — PROCEDURES
Large Joint Aspiration/Injection: L subacromial bursa    Date/Time: 7/30/2024 10:30 AM    Performed by: Jose Ramon Baumann PA-C  Authorized by: Jose Ramon Baumann PA-C    Consent Done?:  Yes (Verbal)  Indications:  Pain and arthritis  Site marked: the procedure site was marked    Timeout: prior to procedure the correct patient, procedure, and site was verified    Prep: patient was prepped and draped in usual sterile fashion    Local anesthesia used?: No      Details:  Needle Size:  22 G  Ultrasonic Guidance for needle placement?: No    Approach:  Posterior  Location:  Shoulder  Site:  L subacromial bursa  Medications:  40 mg triamcinolone acetonide 40 mg/mL  Patient tolerance:  Patient tolerated the procedure well with no immediate complications    Injection Procedure  A time out was performed, including verification of patient ID, procedure, site and side, availability of information and equipment, review of safety issues, and agreement with consent, the procedure site was marked.    After time out was performed, the patient was prepped aseptically with povidone-iodine swabsticks. A diagnostic and therapeutic injection of 1:3cc Kenalog/Marcaine was given under sterile technique using a 22g x 1.5 needle from the Posterior  aspect of the left Subacromial in the sitting position.      Aly Brar had no adverse reactions to the medication. Pain decreased. He was instructed to apply ice to the joint for 20 minutes and avoid strenuous activities for 24-36 hours following the injection. He was warned of possible blood sugar and/or blood pressure changes during that time. Following that time, he can resume regular activities.    He was reminded to call the clinic immediately for any adverse side effects as explained in clinic today.

## 2024-07-30 NOTE — LETTER
Patient: Aly Brar   YOB: 1955   Clinic Number: [id number]   Today's Date: July 30, 2024        Certificate to Return to Work     Aly Dan was seen by Jose Ramon Baumann PA-C on 7/30/2024.    To Whom It May Concern:     Please excuse Aly Dan from work missed on 7/30/24    If you have any questions or concerns, please feel free to contact the office at [phone].    Thank you.    Jose Ramon Baumann PA-C         Signature: __________________________________________________

## 2024-07-31 ENCOUNTER — OFFICE VISIT (OUTPATIENT)
Dept: UROLOGY | Facility: CLINIC | Age: 69
End: 2024-07-31
Payer: MEDICARE

## 2024-07-31 VITALS
DIASTOLIC BLOOD PRESSURE: 91 MMHG | WEIGHT: 190.25 LBS | HEIGHT: 73 IN | HEART RATE: 66 BPM | BODY MASS INDEX: 25.22 KG/M2 | SYSTOLIC BLOOD PRESSURE: 148 MMHG

## 2024-07-31 DIAGNOSIS — Z85.46 HISTORY OF PROSTATE CANCER: ICD-10-CM

## 2024-07-31 DIAGNOSIS — R39.9 LOWER URINARY TRACT SYMPTOMS: Primary | ICD-10-CM

## 2024-07-31 PROCEDURE — 99204 OFFICE O/P NEW MOD 45 MIN: CPT | Mod: S$PBB,,, | Performed by: UROLOGY

## 2024-07-31 PROCEDURE — 99213 OFFICE O/P EST LOW 20 MIN: CPT | Mod: PBBFAC | Performed by: UROLOGY

## 2024-07-31 PROCEDURE — 99999 PR PBB SHADOW E&M-EST. PATIENT-LVL III: CPT | Mod: PBBFAC,,, | Performed by: UROLOGY

## 2024-07-31 NOTE — PROGRESS NOTES
Subjective:      Patient ID: Aly Brar is a 68 y.o. male.    Chief Complaint: BPH  Patient is a 68 y.o. male who is new to our clinic and referred by their PCP, Dr. Patiño for evaluation of prostate cancer and LUTs.     HPI  Benign Prostatic Hypertrophy  Patient complains of lower urinary tract symptoms. He reports frequency, incomplete emptying, nocturia two times a night, and urgency. He denies intermittency and weak stream. Patient states symptoms are of moderate severity. Onset of symptoms was several years ago and was gradual in onset. His AUA Symptom Score is, 14/6. He has a personal history and no family history of prostate cancer. He reports a history of no complicating symptoms. He denies flank pain, gross hematuria, kidney stones, and recurrent UTI.    Has a h/o prostate cancer. Treated with XRT approximately 10 years ago.     Lab Results   Component Value Date    PSA 0.90 07/23/2024       IPSS Questionnaire (AUA-SS) 7/31/24:  Over the past month    1)  Incomplete Emptying - How often have you had a sensation of not emptying your bladder completely after you finish urinating?  3 - About half the time   2)  Frequency - How often have you had to urinate again less than two hours after you finished urinating? 5 - Almost always   3)  Intermittency - How often have you found you stopped and started again several times when you urinated?  0 - Not at all   4) Urgency - How difficult have you found it to postpone urination?  4 - More than half the time   5) Weak Stream - How often have you had a weak urinary stream?  0 - Not at all   6) Straining  - How often have you had to push or strain to begin urination?  0 - Not at all   7) Nocturia - How many times did you most typically get up to urinate from the time you went to bed until the time you got up in the morning?  2 - 2 times   Total score:  0-7 mild, 8-19 moderate, 20-35 severe 14   Quality of Life:  6 - Terrible          Review of Systems  All other systems  reviewed and negative except pertinent positives noted in HPI.      Objective:     Physical Exam  Constitutional:       General: He is not in acute distress.     Appearance: He is well-developed.   HENT:      Head: Normocephalic and atraumatic.   Eyes:      General: No scleral icterus.  Neck:      Trachea: No tracheal deviation.   Pulmonary:      Effort: Pulmonary effort is normal. No respiratory distress.   Neurological:      Mental Status: He is alert and oriented to person, place, and time.   Psychiatric:         Behavior: Behavior normal.         Thought Content: Thought content normal.         Judgment: Judgment normal.         Assessment:     1. Lower urinary tract symptoms    2. History of prostate cancer      Plan:     1. Lower urinary tract symptoms    2. History of prostate cancer        No orders of the defined types were placed in this encounter.    1. BPH/LUTs:  -A post void residual bladder scan was performed immediately after voiding. The patient's PVR was noted to be 0cc.  -Avoid bladder irritants including but not limited to caffeine, alcohol, smoking, spicy foods, acidic foods, tomato-based products, citrus, artificial sweeteners, chocolate, coffee or tea.  -prostate meds: none;  -recommend lifestyle changes.  -discussed anticholinergics and myrbetriq as medical options.  Patient declined.     2. Prostate cancer:  -need records  -recommend repeat psa 1 year.          The patient was seen and examined with the resident physician.  All questions were answered.  The plan was discussed with the patient and I concur with the resident physician's documentation.

## 2024-08-08 DIAGNOSIS — M25.551 BILATERAL HIP PAIN: Primary | ICD-10-CM

## 2024-08-08 DIAGNOSIS — M25.552 BILATERAL HIP PAIN: Primary | ICD-10-CM

## 2024-08-09 ENCOUNTER — HOSPITAL ENCOUNTER (OUTPATIENT)
Dept: RADIOLOGY | Facility: HOSPITAL | Age: 69
Discharge: HOME | End: 2024-08-09
Attending: ORTHOPAEDIC SURGERY
Payer: MEDICARE

## 2024-08-09 ENCOUNTER — OFFICE VISIT (OUTPATIENT)
Dept: ORTHOPEDICS | Facility: CLINIC | Age: 69
End: 2024-08-09
Payer: MEDICARE

## 2024-08-09 VITALS — WEIGHT: 190.25 LBS | BODY MASS INDEX: 25.22 KG/M2 | HEIGHT: 73 IN

## 2024-08-09 DIAGNOSIS — M25.552 BILATERAL HIP PAIN: ICD-10-CM

## 2024-08-09 DIAGNOSIS — G14 POSTPOLIO SYNDROME: Primary | ICD-10-CM

## 2024-08-09 DIAGNOSIS — M25.551 BILATERAL HIP PAIN: ICD-10-CM

## 2024-08-09 DIAGNOSIS — M76.02 GLUTEAL TENDINITIS, LEFT HIP: ICD-10-CM

## 2024-08-09 DIAGNOSIS — M62.559 ATROPHY OF QUADRICEPS FEMORIS MUSCLE: ICD-10-CM

## 2024-08-09 PROCEDURE — 99999 PR PBB SHADOW E&M-EST. PATIENT-LVL III: CPT | Mod: PBBFAC,,, | Performed by: ORTHOPAEDIC SURGERY

## 2024-08-09 PROCEDURE — 73521 X-RAY EXAM HIPS BI 2 VIEWS: CPT | Mod: 26,,, | Performed by: RADIOLOGY

## 2024-08-09 PROCEDURE — 73521 X-RAY EXAM HIPS BI 2 VIEWS: CPT | Mod: TC

## 2024-08-09 PROCEDURE — 99203 OFFICE O/P NEW LOW 30 MIN: CPT | Mod: S$PBB,,, | Performed by: ORTHOPAEDIC SURGERY

## 2024-08-09 PROCEDURE — 99213 OFFICE O/P EST LOW 20 MIN: CPT | Mod: PBBFAC,25 | Performed by: ORTHOPAEDIC SURGERY

## 2024-08-09 RX ORDER — MELOXICAM 15 MG/1
15 TABLET ORAL DAILY
Qty: 30 TABLET | Refills: 1 | Status: SHIPPED | OUTPATIENT
Start: 2024-08-09

## 2024-08-16 NOTE — PROGRESS NOTES
Subjective:       Patient ID: Aly Brar is a 68 y.o. male.    Chief Complaint:   Pain of the Left Hip  He comes in for initial opinion and advice regarding pain in the left hip.  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  He has done well with this other than having to have a triple arthrodesis of his left ankle.  He was then left with a footdrop and had a bridle procedure which has not been very effective in his opinion.  He feels he has a slight leg length inequality with the left side being a little shorter.  No fall, accident, injury, history of pertinent surgery.  No significant pain in the knee.  No distal numbness or tingling.    No past medical history on file.  No past surgical history on file.  No family history on file.  Social History     Socioeconomic History    Marital status: Single   Tobacco Use    Smoking status: Never    Smokeless tobacco: Never   Substance and Sexual Activity    Alcohol use: Not Currently    Drug use: Never    Sexual activity: Not Currently     Social Determinants of Health     Financial Resource Strain: Low Risk  (7/22/2024)    Overall Financial Resource Strain (CARDIA)     Difficulty of Paying Living Expenses: Not hard at all   Food Insecurity: No Food Insecurity (7/22/2024)    Hunger Vital Sign     Worried About Running Out of Food in the Last Year: Never true     Ran Out of Food in the Last Year: Never true   Physical Activity: Unknown (7/22/2024)    Exercise Vital Sign     Days of Exercise per Week: 3 days   Stress: No Stress Concern Present (7/22/2024)    [employer] - Occupational Stress Questionnaire     Feeling of Stress : Not at all   Housing Stability: Unknown (7/22/2024)    Housing Stability Vital Sign     Unable to Pay for Housing in the Last Year: No       Current Outpatient Medications   Medication Sig Dispense Refill    metoprolol tartrate (LOPRESSOR) 50 MG tablet Take 25 mg by mouth every evening.      mupirocin  "(BACTROBAN) 2 % ointment Apply topically 2 (two) times daily. 15 g 0    valsartan (DIOVAN) 160 MG tablet Take 1 tablet (160 mg total) by mouth once daily. 90 tablet 3    meloxicam (MOBIC) 15 MG tablet Take 1 tablet (15 mg total) by mouth once daily. 30 tablet 1     No current facility-administered medications for this visit.     Review of patient's allergies indicates:   Allergen Reactions    Cefazolin Swelling    Amoxicillin     Bactrim [sulfamethoxazole-trimethoprim]     Sulfa (sulfonamide antibiotics)          Objective:      Vitals:    08/09/24 1401   Weight: 86.3 kg (190 lb 4.1 oz)   Height: 6' 1" (1.854 m)     Physical Exam  Negative C sign, negative Stinchfield sign.  Positive tenderness at the greater trochanter and proximal iliotibial band.  No tenderness at the SI joint.  The patient has no pain in the groin with passive flexion and internal rotation of the hip which is not limited.  Knee benign without tenderness or effusion, with normal range of motion, but quads appear somewhat atrophic.  Skin intact.  Distal neurovascular intact.  Flip test negative.    Imaging Review:   X-rays show minimal arthrosis, symmetrically.  No acute findings or suspicious bone defects.  There is slight pelvic obliquity with the right hemipelvis being higher on this supine x-ray.  There is incidental note made of some lumbar spondylosis  Assessment:   Gluteal tendinopathy, left hip.  Post-polio syndrome.  Plan:       She is referred to Dr. Carl Villarreal for further evaluation and non operative care.  He will go to physical therapy to get started working with them in the meantime.  We also referred him for neurology evaluation for post-polio syndrome.  He has weakness and atrophy of the quads.  The patient's pathophysiology was explained in detail with reference to x-rays, models, other visual aids as appropriate.  Treatment options were discussed in detail.  Questions were invited and answered to the patient's " satisfaction.    Maurice Washington MD  Orthopaedic Surgery

## 2024-08-21 ENCOUNTER — OFFICE VISIT (OUTPATIENT)
Dept: OPTOMETRY | Facility: CLINIC | Age: 69
End: 2024-08-21
Payer: MEDICARE

## 2024-08-21 ENCOUNTER — TELEPHONE (OUTPATIENT)
Dept: OPHTHALMOLOGY | Facility: CLINIC | Age: 69
End: 2024-08-21
Payer: MEDICARE

## 2024-08-21 ENCOUNTER — TELEPHONE (OUTPATIENT)
Dept: OPTOMETRY | Facility: CLINIC | Age: 69
End: 2024-08-21
Payer: MEDICARE

## 2024-08-21 DIAGNOSIS — H52.4 MYOPIA WITH ASTIGMATISM AND PRESBYOPIA, BILATERAL: ICD-10-CM

## 2024-08-21 DIAGNOSIS — H40.013 OAG (OPEN ANGLE GLAUCOMA) SUSPECT, LOW RISK, BILATERAL: Primary | ICD-10-CM

## 2024-08-21 DIAGNOSIS — H52.13 MYOPIA WITH ASTIGMATISM AND PRESBYOPIA, BILATERAL: ICD-10-CM

## 2024-08-21 DIAGNOSIS — H52.203 MYOPIA WITH ASTIGMATISM AND PRESBYOPIA, BILATERAL: ICD-10-CM

## 2024-08-21 DIAGNOSIS — H25.13 SENILE NUCLEAR SCLEROSIS, BILATERAL: ICD-10-CM

## 2024-08-21 PROCEDURE — 99999 PR PBB SHADOW E&M-EST. PATIENT-LVL III: CPT | Mod: PBBFAC,,, | Performed by: OPTOMETRIST

## 2024-08-21 PROCEDURE — 99213 OFFICE O/P EST LOW 20 MIN: CPT | Mod: PBBFAC,PO | Performed by: OPTOMETRIST

## 2024-08-21 PROCEDURE — 92004 COMPRE OPH EXAM NEW PT 1/>: CPT | Mod: S$PBB,,, | Performed by: OPTOMETRIST

## 2024-08-21 NOTE — TELEPHONE ENCOUNTER
----- Message from Carlos Smith sent at 8/21/2024  2:27 PM CDT -----  Regarding: Ambulatory referral/consult to Ophthalmology  Good afternoon,     Patient is requesting a callback ASA this afternoon to schedule soonest available appointment due to condition regarding referral/consult to Ophthalmology. Please give the patient a callback at [phone].    Dx: Senile nuclear sclerosis, bilateral, Cataract      Thank you,     LEE ANN Smith

## 2024-08-21 NOTE — PROGRESS NOTES
SANDER HENDRICKSON: 3 yrs ago in [place]  Chief complaint (CC):  Pt. Reports today near vision have changed in the   past few year.   Pt. Was previously told he had cataract and has trouble reading the sings   at distance and sensitive to light at night.  Pt has trouble with   determining how many zeros are in a sequence.  Glasses? + Only wear for driving  Contacts? -  H/o eye surgery, injections or laser: -  H/o eye injury: -  Known eye conditions? -  Family h/o eye conditions? Sister glaucoma  Eye gtts? -      (-) Flashes (-)  Floaters (-) Mucous   (-)  Tearing (-) Itching (-) Burning   (-) Headaches (-) Eye Pain/discomfort (-) Irritation   (-)  Redness (-) Double vision (-) Blurry vision    Diabetic? -  A1c? Hemoglobin A1C       Date                     Value               Ref Range             Status                07/23/2024               5.4                 4.0 - 5.6 %           Final                Last edited by Bianca Cross, OD on 8/21/2024 11:38 AM.            Assessment /Plan     For exam results, see Encounter Report.      OAG (open angle glaucoma) suspect, low risk, bilateral  -     Posterior Segment OCT Optic Nerve- Both eyes; Future  -     Ferraro Visual Field - OU - Extended - Both Eyes; Future  (+) FHx- sister. IOP 13 OD, Os. C/d 0.55 OD, 0.5 OS. Educated pt on findings w/understanding. RTC 1 mo IOP/pachy/OCT/24-2 SS HVF    Senile nuclear sclerosis, bilateral  -     Ambulatory referral/consult to Ophthalmology; Future; Expected date: 08/28/2024  Visually significant. Myopic shift OD. Pt c/o glare OU. Educated pt on potential for surgery. Referral to Dr Saavedra.     Myopia with astigmatism and presbyopia, bilateral  Hold SRx.

## 2024-08-21 NOTE — LETTER
August 21, 2024    Aly Brar  [address] [facility] [address]      [facility]  [address] [place] [address].  [facility] [address]  Phone: [phone]  Fax: [phone] Aly Brar    Was treated here on 08/21/2024    May Return to work/school on 8/22/2024        Sincerely,    Bianca Cross, OD

## 2024-08-27 ENCOUNTER — TELEPHONE (OUTPATIENT)
Dept: OPHTHALMOLOGY | Facility: CLINIC | Age: 69
End: 2024-08-27
Payer: MEDICARE

## 2024-08-27 NOTE — TELEPHONE ENCOUNTER
----- Message from Carlos Smith sent at 8/27/2024 10:35 AM CDT -----  Regarding: FW: Ambulatory referral/consult to Ophthalmology  Good morning,     Patient is requesting a callback ASAP this morning to schedule appointment. Patient missed previous call to schedule soonest available appointment due to condition regarding referral/consult to Ophthalmology. Please give the patient a callback at [phone].    Thank you,     LEE ANN Smith  ----- Message -----  From: Carlos Smith  Sent: 8/21/2024   2:30 PM CDT  To: Quentin GARCIA Staff  Subject: Ambulatory referral/consult to Ophthalmology     Good afternoon,     Patient is requesting a callback ASAP this afternoon to schedule soonest available appointment due to condition regarding referral/consult to Ophthalmology. Please give the patient a callback at [phone].    Dx: Senile nuclear sclerosis, bilateral, Cataract      Thank you,     LEE ANN Smith

## 2024-09-15 ENCOUNTER — PATIENT MESSAGE (OUTPATIENT)
Dept: INTERNAL MEDICINE | Facility: CLINIC | Age: 69
End: 2024-09-15
Payer: MEDICARE

## 2024-09-15 DIAGNOSIS — L98.9 SKIN LESION: Primary | ICD-10-CM

## 2024-10-02 ENCOUNTER — TELEPHONE (OUTPATIENT)
Dept: OPTOMETRY | Facility: CLINIC | Age: 69
End: 2024-10-02
Payer: MEDICARE

## 2024-10-03 ENCOUNTER — CLINICAL SUPPORT (OUTPATIENT)
Dept: OPHTHALMOLOGY | Facility: CLINIC | Age: 69
End: 2024-10-03
Payer: MEDICARE

## 2024-10-03 ENCOUNTER — OFFICE VISIT (OUTPATIENT)
Dept: OPTOMETRY | Facility: CLINIC | Age: 69
End: 2024-10-03
Payer: MEDICARE

## 2024-10-03 DIAGNOSIS — H40.013 OAG (OPEN ANGLE GLAUCOMA) SUSPECT, LOW RISK, BILATERAL: Primary | ICD-10-CM

## 2024-10-03 DIAGNOSIS — H25.13 SENILE NUCLEAR SCLEROSIS, BILATERAL: ICD-10-CM

## 2024-10-03 DIAGNOSIS — H40.013 OAG (OPEN ANGLE GLAUCOMA) SUSPECT, LOW RISK, BILATERAL: ICD-10-CM

## 2024-10-03 PROCEDURE — 99212 OFFICE O/P EST SF 10 MIN: CPT | Mod: PBBFAC,PO | Performed by: OPTOMETRIST

## 2024-10-03 PROCEDURE — 76514 ECHO EXAM OF EYE THICKNESS: CPT | Mod: PBBFAC,PO | Performed by: OPTOMETRIST

## 2024-10-03 PROCEDURE — 99999 PR PBB SHADOW E&M-EST. PATIENT-LVL II: CPT | Mod: PBBFAC,,, | Performed by: OPTOMETRIST

## 2024-10-03 NOTE — PROGRESS NOTES
HVF/OCT rel/fix good OD poor O)S coop good OU/chart checked for latex allergy/-2.50 + 1.25 x 165 OD -2.00 + 1.75 x 166 OS -BJ

## 2024-10-04 NOTE — PROGRESS NOTES
HPI    MADHAVI: 08/24  Chief complaint (CC): patient is here for a follow up with   HVF,OCT,pachymetry and IOP check today.   Glasses? +  Contacts? -  H/o eye surgery, injections or laser: -  H/o eye injury: -  Known eye conditions? See above  Family h/o eye conditions? -  Eye gtts? -      (-) Flashes (-)  Floaters (-) Mucous   (-)  Tearing (-) Itching (-) Burning   (-) Headaches (-) Eye Pain/discomfort (-) Irritation   (-)  Redness (-) Double vision (-) Blurry vision    Diabetic? -  A1c? -      Last edited by Maile Denny on 10/3/2024 10:05 AM.            Assessment /Plan     For exam results, see Encounter Report.    OAG (open angle glaucoma) suspect, low risk, bilateral    Senile nuclear sclerosis, bilateral      (+) FHx- sister. IOP 13 OD, Os. C/d 0.55 OD, 0.5 OS. Pachy 496 OD, OS.   10/3/2024 OCT WNL OU (thick NS and NI OU but normal appearance w/undilated 90D)  10/3/2024 HVF lid defect OU  No e/o glaucoma.  Educated pt on findings w/understanding.   RTC 1 year Routine/OcT/24-2 theo faster

## 2024-11-20 ENCOUNTER — TELEPHONE (OUTPATIENT)
Dept: DERMATOLOGY | Facility: CLINIC | Age: 69
End: 2024-11-20
Payer: MEDICARE

## 2024-11-20 NOTE — TELEPHONE ENCOUNTER
"----- Message from Sumi sent at 11/20/2024 10:48 AM CST -----  Regarding: pt advice  Contact: [phone]  .Name Of Caller: Self     Contact Preference?:    [phone]  What is the nature of the call?: pt caller in regards to needing to see if provider has any opening for today.Pt states he's outside sitting in his car  Pls call      Additional Notes:  "Thank you for all that you do for our patients"  "

## 2024-11-21 ENCOUNTER — OFFICE VISIT (OUTPATIENT)
Dept: DERMATOLOGY | Facility: CLINIC | Age: 69
End: 2024-11-21
Payer: MEDICARE

## 2024-11-21 DIAGNOSIS — L91.8 SKIN TAG: ICD-10-CM

## 2024-11-21 DIAGNOSIS — L81.9 HYPERPIGMENTATION: ICD-10-CM

## 2024-11-21 DIAGNOSIS — D49.9 NEOPLASM: Primary | ICD-10-CM

## 2024-11-21 DIAGNOSIS — L29.9 SCALP ITCH: ICD-10-CM

## 2024-11-21 DIAGNOSIS — L72.0 EPIDERMAL CYST: ICD-10-CM

## 2024-11-21 PROCEDURE — 11102 TANGNTL BX SKIN SINGLE LES: CPT | Mod: S$PBB,,, | Performed by: DERMATOLOGY

## 2024-11-21 PROCEDURE — 99213 OFFICE O/P EST LOW 20 MIN: CPT | Mod: PBBFAC,PN | Performed by: DERMATOLOGY

## 2024-11-21 PROCEDURE — 99204 OFFICE O/P NEW MOD 45 MIN: CPT | Mod: 25,S$PBB,, | Performed by: DERMATOLOGY

## 2024-11-21 PROCEDURE — 99999 PR PBB SHADOW E&M-EST. PATIENT-LVL III: CPT | Mod: PBBFAC,,, | Performed by: DERMATOLOGY

## 2024-11-21 PROCEDURE — 11102 TANGNTL BX SKIN SINGLE LES: CPT | Mod: PBBFAC,PN | Performed by: DERMATOLOGY

## 2024-11-21 RX ORDER — KETOCONAZOLE 20 MG/G
CREAM TOPICAL 2 TIMES DAILY
Qty: 60 G | Refills: 5 | Status: SHIPPED | OUTPATIENT
Start: 2024-11-21

## 2024-11-21 NOTE — PROGRESS NOTES
Subjective:      Patient ID:  Aly Brar is a 69 y.o. male who presents for   Chief Complaint   Patient presents with    Lesion     Right index finder      Lesion - Initial  Affected locations: right fingers  Duration: 3 months  Signs / symptoms: tender, irritated and inflamed  Severity: mild to moderate  Aggravated by: nothing  Relieving factors/Treatments tried: nothing    Skin Tags - Initial  Affected locations: back  Duration: 20 years  Signs / symptoms: tender  Severity: mild to moderate  Timing: constant  Aggravated by: pressure  Relieving factors/Treatments tried: nothing      Review of Systems   Constitutional: Negative.    HENT: Negative.     Respiratory: Negative.     Musculoskeletal: Negative.        Objective:   Physical Exam   Constitutional: He appears well-developed and well-nourished.   Neurological: He is alert and oriented to person, place, and time.   Psychiatric: He has a normal mood and affect.   Skin:                    Diagram Legend     Erythematous scaling macule/papule c/w actinic keratosis       Vascular papule c/w angioma      Pigmented verrucoid papule/plaque c/w seborrheic keratosis      Yellow umbilicated papule c/w sebaceous hyperplasia      Irregularly shaped tan macule c/w lentigo     1-2 mm smooth white papules consistent with Milia      Movable subcutaneous cyst with punctum c/w epidermal inclusion cyst      Subcutaneous movable cyst c/w pilar cyst      Firm pink to brown papule c/w dermatofibroma      Pedunculated fleshy papule(s) c/w skin tag(s)      Evenly pigmented macule c/w junctional nevus     Mildly variegated pigmented, slightly irregular-bordered macule c/w mildly atypical nevus      Flesh colored to evenly pigmented papule c/w intradermal nevus       Pink pearly papule/plaque c/w basal cell carcinoma      Erythematous hyperkeratotic cursted plaque c/w SCC      Surgical scar with no sign of skin cancer recurrence      Open and closed comedones      Inflammatory papules  and pustules      Verrucoid papule consistent consistent with wart     Erythematous eczematous patches and plaques     Dystrophic onycholytic nail with subungual debris c/w onychomycosis     Umbilicated papule    Erythematous-base heme-crusted tan verrucoid plaque consistent with inflamed seborrheic keratosis     Erythematous Silvery Scaling Plaque c/w Psoriasis     See annotation      Assessment / Plan:      Pathology Orders:       Normal Orders This Visit    Specimen to Pathology, Dermatology     Questions:    Procedure Type: Dermatology and skin neoplasms    Number of Specimens: 1    ------------------------: -------------------------    Spec 1 Procedure: Biopsy    Spec 1 Clinical Impression: pyogenic gran    Spec 1 Source: finger    Release to patient:           Neoplasm  -     Specimen to Pathology, Dermatology  Shave biopsy procedure note:    Shave biopsy performed after verbal consent including risk of infection, scar, recurrence, need for additional treatment of site. Area prepped with alcohol, anesthetized with approximately 1.0cc of 1% lidocaine with epinephrine. Lesional tissue shaved with razor blade. Hemostasis achieved with application of aluminum chloride followed by hyfrecation. No complications. Dressing applied. Wound care explained.    Epidermal cyst  Discussed with patient the benign nature of these lesions and that no treatment is indicated.    Skin tag  Plan shave rem at fu appt.  Due to large size >1 cm.    Hyperpigmentation  Cons hq later?  Discussed with the patient the risk of color scars, erythema, or hyperpigmentation that could take months to resolve.  From itching scalp.      Scalp itch  -     ketoconazole (NIZORAL) 2 % cream; Apply topically 2 (two) times daily. Prn itch of scalp  Dispense: 60 g; Refill: 5  Trial of keto.  No hot water bathing reviewed.  Reviewed with patient different treatment options and associated risks.  Discussed with patient the etiology and pathogenesis of the  disease or skin lesion(s) and possible treatments and aggravators.    Proper application of medications and or care for affected area(s) and condition(s) reviewed.             Follow up in about 3 weeks (around 12/12/2024).

## 2024-11-21 NOTE — PATIENT INSTRUCTIONS
Shave Biopsy Wound Care    Your doctor has performed a shave biopsy today.  A band aid and vaseline ointment has been placed over the site.  This should remain in place for NO LONGER THAN 48 hours.  It is fine to remove the bandaid after 24 hours, if the area is no longer bleeding. It is recommended that you keep the area dry (do not wet)) for the first 24 hours.  After 24 hours, wash the area with warm soap and water and apply Vaseline jelly.  Many patients prefer to use Neosporin or Bacitracin ointment.  This is acceptable; however, know that you can develop an allergy to this medication even if you have used it safely for years.  It is important to keep the area moist.  Letting it dry out and get air slows healing time, and will worsen the scar.        If you notice increasing redness, tenderness, pain, or yellow drainage at the biopsy site, please notify your doctor.  These are signs of an infection.    If your biopsy site is bleeding, apply firm pressure for 15 minutes straight.  Repeat for another 15 minutes, if it is still bleeding.   If the surgical site continues to bleed, then please contact your doctor.      For [facility] users:   You will receive your biopsy results in [facility] as soon as they are available. Please be assured that your physician/provider will review your results and will then determine what further treatment, evaluation, or planning is required. You should be contacted by your physician's/provider's office within 5 business days of receiving your results; If not, please reach out to directly. This is one more way [facility] putting you first.     [facility] [place] [address]/ [phone] FAX/ [email or web address]

## 2024-12-12 ENCOUNTER — PROCEDURE VISIT (OUTPATIENT)
Dept: DERMATOLOGY | Facility: CLINIC | Age: 69
End: 2024-12-12
Payer: MEDICARE

## 2024-12-12 DIAGNOSIS — L29.9 ITCH: ICD-10-CM

## 2024-12-12 DIAGNOSIS — L98.9 SKIN LESION: ICD-10-CM

## 2024-12-12 DIAGNOSIS — L90.5 SCAR: ICD-10-CM

## 2024-12-12 DIAGNOSIS — D36.10 NEUROFIBROMA: Primary | ICD-10-CM

## 2024-12-12 DIAGNOSIS — L82.1 SK (SEBORRHEIC KERATOSIS): ICD-10-CM

## 2024-12-12 DIAGNOSIS — L98.0 PYOGENIC GRANULOMA: ICD-10-CM

## 2024-12-12 PROCEDURE — 11302 SHAVE SKIN LESION 1.1-2.0 CM: CPT | Mod: PBBFAC,PN | Performed by: DERMATOLOGY

## 2024-12-12 NOTE — PATIENT INSTRUCTIONS
Shave Biopsy Wound Care    Your doctor has performed a shave biopsy today.  A band aid and vaseline ointment has been placed over the site.  This should remain in place for NO LONGER THAN 48 hours.  It is fine to remove the bandaid after 24 hours, if the area is no longer bleeding. It is recommended that you keep the area dry (do not wet)) for the first 24 hours.  After 24 hours, wash the area with warm soap and water and apply Vaseline jelly.  Many patients prefer to use Neosporin or Bacitracin ointment.  This is acceptable; however, know that you can develop an allergy to this medication even if you have used it safely for years.  It is important to keep the area moist.  Letting it dry out and get air slows healing time, and will worsen the scar.        If you notice increasing redness, tenderness, pain, or yellow drainage at the biopsy site, please notify your doctor.  These are signs of an infection.    If your biopsy site is bleeding, apply firm pressure for 15 minutes straight.  Repeat for another 15 minutes, if it is still bleeding.   If the surgical site continues to bleed, then please contact your doctor.      For [facility] users:   You will receive your biopsy results in [facility] as soon as they are available. Please be assured that your physician/provider will review your results and will then determine what further treatment, evaluation, or planning is required. You should be contacted by your physician's/provider's office within 5 business days of receiving your results; If not, please reach out to directly. This is one more way [facility] is putting you first.     [facility] [place] [address]/ [phone] FAX/ [email or web address]

## 2024-12-12 NOTE — PROGRESS NOTES
Pt here for growth removal from the back.  Also check finger from shave.    Subjective:      Patient ID:  Aly Brar is a 69 y.o. male who presents for No chief complaint on file.    HPI    Review of Systems   Constitutional:  Negative for fever.   HENT:  Negative for sore throat.    Respiratory:  Negative for cough.        Objective:   Physical Exam     Diagram Legend     Erythematous scaling macule/papule c/w actinic keratosis       Vascular papule c/w angioma      Pigmented verrucoid papule/plaque c/w seborrheic keratosis      Yellow umbilicated papule c/w sebaceous hyperplasia      Irregularly shaped tan macule c/w lentigo     1-2 mm smooth white papules consistent with Milia      Movable subcutaneous cyst with punctum c/w epidermal inclusion cyst      Subcutaneous movable cyst c/w pilar cyst      Firm pink to brown papule c/w dermatofibroma      Pedunculated fleshy papule(s) c/w skin tag(s)      Evenly pigmented macule c/w junctional nevus     Mildly variegated pigmented, slightly irregular-bordered macule c/w mildly atypical nevus      Flesh colored to evenly pigmented papule c/w intradermal nevus       Pink pearly papule/plaque c/w basal cell carcinoma      Erythematous hyperkeratotic cursted plaque c/w SCC      Surgical scar with no sign of skin cancer recurrence      Open and closed comedones      Inflammatory papules and pustules      Verrucoid papule consistent consistent with wart     Erythematous eczematous patches and plaques     Dystrophic onycholytic nail with subungual debris c/w onychomycosis     Umbilicated papule    Erythematous-base heme-crusted tan verrucoid plaque consistent with inflamed seborrheic keratosis     Erythematous Silvery Scaling Plaque c/w Psoriasis     See annotation    R finger with dry healing red area.  L pit with 2 cm flat sk.      Assessment / Plan:      Pathology Orders:       Normal Orders This Visit    Specimen to Pathology, Dermatology     Questions:    Procedure Type:  Dermatology and skin neoplasms    Number of Specimens: 1    ------------------------: -------------------------    Spec 1 Procedure: Other (Specify) Comment - shave    Spec 1 Clinical Impression: nf    Spec 1 Source: back    Release to patient:           Neurofibroma  -     Specimen to Pathology, Dermatology  Reviewed with patient different treatment options and associated risks.  Shave removal procedure note:    Lesion size 1.2 cm    Shave performed after verbal consent including risk of infection, scar, recurrence, need for additional treatment of site. Area prepped with alcohol, anesthetized with approximately 1.0cc of 1% lidocaine with epinephrine. Lesional tissue shaved with razor blade. Hemostasis achieved with application of aluminum chloride followed by hyfrecation as needed. No complications. Dressing applied. Wound care explained.      Skin lesion  -     Ambulatory referral/consult to Dermatology    Pyogenic granuloma  No signs of recurrence are noted in previous surgical areas or scars.    Scar  Long term scar discussed.    SK (seborrheic keratosis)  Discussed with patient the benign nature of these lesions and that no treatment is indicated.  Chronic nature of this condition discussed with patient.  Brochure given for patient education.  Discussed with patient to use organic coconut oil or pure shea butter at least daily for moisturization for the body and organic jojoba oil at least daily for the face.  Sandals or slippers at home as not to slide around and risk fall on non carpeted floors if applied to the soles.    Itch  Scalp.  Pt did not start anthony .  Reviewed with patient different treatment options and associated risks.  Start this!           Follow up if symptoms worsen or fail to improve.

## 2024-12-17 ENCOUNTER — PATIENT MESSAGE (OUTPATIENT)
Dept: DERMATOLOGY | Facility: CLINIC | Age: 69
End: 2024-12-17
Payer: MEDICARE

## 2025-01-17 ENCOUNTER — TELEPHONE (OUTPATIENT)
Dept: NEUROLOGY | Facility: CLINIC | Age: 70
End: 2025-01-17
Payer: MEDICARE

## 2025-01-17 ENCOUNTER — TELEPHONE (OUTPATIENT)
Dept: NEUROSURGERY | Facility: CLINIC | Age: 70
End: 2025-01-17
Payer: MEDICARE

## 2025-01-17 NOTE — TELEPHONE ENCOUNTER
Left a message for the patient about his appointment on 1/22/25 to see if he want to move it to a virtual or reschedule.

## 2025-01-27 ENCOUNTER — OFFICE VISIT (OUTPATIENT)
Dept: OPHTHALMOLOGY | Facility: CLINIC | Age: 70
End: 2025-01-27
Payer: MEDICARE

## 2025-01-27 DIAGNOSIS — H40.013 OAG (OPEN ANGLE GLAUCOMA) SUSPECT, LOW RISK, BILATERAL: ICD-10-CM

## 2025-01-27 DIAGNOSIS — H25.13 NUCLEAR SCLEROSIS OF BOTH EYES: Primary | ICD-10-CM

## 2025-01-27 DIAGNOSIS — H52.7 REFRACTIVE ERROR: ICD-10-CM

## 2025-01-27 DIAGNOSIS — H26.9 CORTICAL CATARACT OF BOTH EYES: ICD-10-CM

## 2025-01-27 PROCEDURE — 92136 OPHTHALMIC BIOMETRY: CPT | Mod: PBBFAC,PO | Performed by: OPHTHALMOLOGY

## 2025-01-27 PROCEDURE — 99999 PR PBB SHADOW E&M-EST. PATIENT-LVL II: CPT | Mod: PBBFAC,,, | Performed by: OPHTHALMOLOGY

## 2025-01-27 PROCEDURE — 99212 OFFICE O/P EST SF 10 MIN: CPT | Mod: PBBFAC,PO | Performed by: OPHTHALMOLOGY

## 2025-01-27 PROCEDURE — 92004 COMPRE OPH EXAM NEW PT 1/>: CPT | Mod: S$PBB,,, | Performed by: OPHTHALMOLOGY

## 2025-01-28 ENCOUNTER — PATIENT MESSAGE (OUTPATIENT)
Dept: OPHTHALMOLOGY | Facility: CLINIC | Age: 70
End: 2025-01-28
Payer: MEDICARE

## 2025-01-28 ENCOUNTER — TELEPHONE (OUTPATIENT)
Dept: OPHTHALMOLOGY | Facility: CLINIC | Age: 70
End: 2025-01-28
Payer: MEDICARE

## 2025-01-28 NOTE — PROGRESS NOTES
Subjective:       Patient ID: Aly Brar is a 69 y.o. male.    Chief Complaint: Cataract    HPI    Here for cataract evaluation     Eye meds: None    69 year old male states he is seeing very blurry even with glasses. C/o of   bright lights affecting his driving. C/o of glare. Denies flashes,   floaters and diplopia   Last edited by Sparkle Gutierrez on 1/27/2025  3:13 PM.             Assessment:       1. Nuclear sclerosis of both eyes    2. Cortical cataract of both eyes    3. OAG (open angle glaucoma) suspect, low risk, bilateral    4. Refractive error        Plan:       Visually significant cataract OU -Pt. Wants Sx.    Glaucoma suspect OU-IOP's are acceptable OU & ON's appear stable OU.  RE      Cataract Surgery Consent: Patient with a visually significant cataract with difficulties of ADLs, reading, driving, night vision, glare (any and all).  Discussed with Patient/Family/Caregiver: options, risks and benefits, expectations of cataract surgery, utilized an eye model with questions and answers to facilitate discussion.  Discussed lens options and patient understands that glasses may be required for optimal vision for distance and/or near vision after cataract surgery.  The Patient/Family/Caregiver  voice good understanding and patient wishes to proceed with surgery.  The patient will likely benefit from surgery and patient signed consent for Right Eye.  CE OD 1st CNAOTO 15.0,        OS 2nd CNAOTO 15.0.

## 2025-01-30 ENCOUNTER — TELEPHONE (OUTPATIENT)
Dept: OPHTHALMOLOGY | Facility: CLINIC | Age: 70
End: 2025-01-30
Payer: MEDICARE

## 2025-02-20 ENCOUNTER — PATIENT MESSAGE (OUTPATIENT)
Dept: OPHTHALMOLOGY | Facility: CLINIC | Age: 70
End: 2025-02-20
Payer: MEDICARE

## 2025-03-07 ENCOUNTER — TELEPHONE (OUTPATIENT)
Dept: NEUROLOGY | Facility: CLINIC | Age: 70
End: 2025-03-07
Payer: MEDICARE

## 2025-03-07 NOTE — TELEPHONE ENCOUNTER
Attempted to contact pt in regards to appointment being rescheduled with , no answer left detailed message

## 2025-04-01 ENCOUNTER — PATIENT MESSAGE (OUTPATIENT)
Dept: OPHTHALMOLOGY | Facility: CLINIC | Age: 70
End: 2025-04-01
Payer: MEDICARE

## 2025-05-07 ENCOUNTER — OFFICE VISIT (OUTPATIENT)
Dept: NEUROLOGY | Facility: CLINIC | Age: 70
End: 2025-05-07
Payer: MEDICARE

## 2025-05-07 ENCOUNTER — PATIENT MESSAGE (OUTPATIENT)
Dept: NEUROLOGY | Facility: CLINIC | Age: 70
End: 2025-05-07

## 2025-05-07 VITALS
HEART RATE: 88 BPM | WEIGHT: 196 LBS | BODY MASS INDEX: 25.86 KG/M2 | SYSTOLIC BLOOD PRESSURE: 162 MMHG | DIASTOLIC BLOOD PRESSURE: 97 MMHG

## 2025-05-07 DIAGNOSIS — G14 POSTPOLIO SYNDROME: Primary | ICD-10-CM

## 2025-05-07 PROCEDURE — 99213 OFFICE O/P EST LOW 20 MIN: CPT | Mod: PBBFAC,PN | Performed by: STUDENT IN AN ORGANIZED HEALTH CARE EDUCATION/TRAINING PROGRAM

## 2025-05-07 PROCEDURE — 99999 PR PBB SHADOW E&M-EST. PATIENT-LVL III: CPT | Mod: PBBFAC,,, | Performed by: STUDENT IN AN ORGANIZED HEALTH CARE EDUCATION/TRAINING PROGRAM

## 2025-05-07 PROCEDURE — 99203 OFFICE O/P NEW LOW 30 MIN: CPT | Mod: S$PBB,,, | Performed by: STUDENT IN AN ORGANIZED HEALTH CARE EDUCATION/TRAINING PROGRAM

## 2025-05-07 NOTE — PROGRESS NOTES
GENERAL NEUROLOGY VISIT   Date: 5/7/25  Patient Name: Aly Brar   MRN: [id number]   PCP: Valerie, Primary Doctor  Referring Provider: Maurice Washington MD    History:    Patient is a 69 y.o.  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    Patient went to orthopedic for left hip pain.  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  Doing good other than having triple ortho thesis of his left ankle.  With residual food drop and had brittle procedure reported not very effective per the patient.  Reported feels left leg short-term the right.     Hip x-ray reported mild symmetric degenerative changes of both hips, no other findings.    Patient reported symptoms of shaking legs while standing for awhile, had to sit down in the shaking subsided, no fall, no weakness.  Reported this has been going on for over a year but feel worsening frequency in the past 6 months, started on valsartan for blood pressure recently, denies alcohol, smoking, drinks caffeine once cup per day.    Patient reported MRI lumbar done and shows bulging disc before, not found on the record.   MRI brain from 2024 showed mild chronic microvascular changes and mild cerebellar volume loss.     Baseline, residual left food drop and legs length discrepancy, ambulate with no issues.     Social History[1]    Review of patient's allergies indicates:   Allergen Reactions    Cefazolin Swelling    Amoxicillin     Bactrim [sulfamethoxazole-trimethoprim]     Sulfa (sulfonamide antibiotics)        Medications Ordered Prior to Encounter[2]     Family history:  No family history on file.    Review Of Systems     Constitutional Negative for fevers, chills, weigh loss   HEENT Negative for hearing loss, dysphagia, sore throat, diplopia   Respiratory Negative for shortness of breath, cough    Cardiovascular Negative for chest pain, palpitations    Gastrointestinal Negative for constipation, diarrhea, early satiety    Skin Negative for rashes     Musculoskeletal Negative for joint pains, myalgias.   Neurological See Above    Psychological Negative for sleep disturbances.    Heme/Lymph Negative for easy bruising, easy bleeding    Endocrine Negative for polyuria, polydypsia     Physical Exam:     Physical Examination    Vitals: BP (!) 162/97 (BP Location: Right arm, Patient Position: Sitting)   Pulse 88   Wt 88.9 kg (196 lb)   BMI 25.86 kg/m²       NEURO    Neurological Exam  Mental Status:  Alert and oriented to person, place and time, recent and remote memory intact, normal attention span and fund of knowledge; Speech is spontaneous and fluent     Cranial Nerve exam:  II: Visual fields full to confrontation; Pupils equal round and reactive about 3mm  III, IV, VI: Extraoccular movements intact with no nystagmus  V: Sensation in V1, V2, V3 intact to light-touch bilaterally,  VII:  No facial weakness,  VIII: Hearing grossly intact  IX,X: palate elevation symmetric   XI: SCM & Trapezius normal,  XII: tongue midline, normal morphology, tongue movement normal     Motor Exam: No involuntary movement. Normal tone and bulk in all 4 extremities  Strength: 5/5 throughout   Reflexes: 2+ throughout    Sensory Exam: Intact touch, pain and vibration in all 4 limbs    Cerebellar Sign: Normal Finger-to-nose,   bilaterally.  Gait:  Normal steady physiologic gait with normal arm swinging on both side     Interval/Previous Work-up:   Results for orders placed during the hospital encounter of 07/11/24    MRI Brain Without Contrast    Narrative  EXAMINATION:  MRI BRAIN WITHOUT CONTRAST    CLINICAL HISTORY:  Dizziness, persistent/recurrent, cardiac or vascular cause suspected;    TECHNIQUE:  Multiplanar multisequence MR imaging of the brain was performed without the administration of intravenous contrast.    COMPARISON:  CT head same-date.    FINDINGS:  Prominence of the ventricles and sulci compatible with mild generalized cerebral volume loss.  No hydrocephalus.    Mild  patchy T2/FLAIR hyperintensity in the supratentorial white matter, nonspecific but most likely reflecting chronic microvascular ischemic changes. No recent or remote major vascular distribution infarct. No recent or remote hemorrhage.  No mass effect or midline shift.    No extra-axial blood or fluid collections.    The T2 skull base flow voids are preserved.  Bone marrow signal intensity unremarkable.    Small bilateral mastoid fluid.  Trace mucosal thickening ethmoidal air cells.    Impression  No evidence of acute infarct or hemorrhage.    Mild chronic small vessel ischemic change and generalized cerebral volume loss.    Electronically signed by resident: William Oneill  Date:    07/11/2024  Time:    15:18    Electronically signed by: Pancho Nova MD  Date:    07/11/2024  Time:    15:32    No results found for this or any previous visit.    No results found for this or any previous visit.        Assessment and Plan:   Aly Brar is a 69 y.o. male presenting as a referral from orthopedic for reported post-polio syndrome. Patient reported 1 year of shaking legs after standing for a while worsen in the past 6 months. Reported no issues/tremor when walking. On exam : no tremor with standing up for few minutes today.  Reported no issue with his gait from baseline.     Differential could be post polio syndrome residual giving the food drop and leg length discrepancy related vs postural tremor.    Problem List Items Addressed This Visit    None  Visit Diagnoses         Postpolio syndrome    -  Primary    Relevant Orders    EMG W/ ULTRASOUND AND NERVE CONDUCTION TEST 2 Extremities                               RTC 4  months     Time spent on this encounter: 40 minutes. This includes face to face time and non-face to face time preparing to see the patient (eg, review of tests), obtaining and/or reviewing separately obtained history, documenting clinical information in the electronic or other health record, independently  interpreting results and communicating results to the patient/family/caregiver, or care coordinator.       A dictation device was used to produce this document. Use of such devices sometimes results in grammatical errors or replacement of words that sound similarly.     Jennifer Brownlee MD, M.B.Ch.B  Neurology, Vascular neurology  [facility]         [1]   Social History  Socioeconomic History    Marital status: Single   Tobacco Use    Smoking status: Never    Smokeless tobacco: Never   Substance and Sexual Activity    Alcohol use: Not Currently    Drug use: Never    Sexual activity: Not Currently     Social Drivers of Health     Financial Resource Strain: Low Risk  (7/22/2024)    Overall Financial Resource Strain (CARDIA)     Difficulty of Paying Living Expenses: Not hard at all   Food Insecurity: No Food Insecurity (7/22/2024)    Hunger Vital Sign     Worried About Running Out of Food in the Last Year: Never true     Ran Out of Food in the Last Year: Never true   Physical Activity: Unknown (7/22/2024)    Exercise Vital Sign     Days of Exercise per Week: 3 days   Stress: No Stress Concern Present (7/22/2024)    [place] [employer] - Occupational Stress Questionnaire     Feeling of Stress : Not at all   Housing Stability: Unknown (7/22/2024)    Housing Stability Vital Sign     Unable to Pay for Housing in the Last Year: No   [2]   Current Outpatient Medications on File Prior to Visit   Medication Sig Dispense Refill    ketoconazole (NIZORAL) 2 % cream Apply topically 2 (two) times daily. Prn itch of scalp 60 g 5    meloxicam (MOBIC) 15 MG tablet Take 1 tablet (15 mg total) by mouth once daily. 30 tablet 1    metoprolol tartrate (LOPRESSOR) 50 MG tablet Take 25 mg by mouth every evening.      mupirocin (BACTROBAN) 2 % ointment Apply topically 2 (two) times daily. 15 g 0    valsartan (DIOVAN) 160 MG tablet Take 1 tablet (160 mg total) by mouth once daily. 90 tablet 3     No current  facility-administered medications on file prior to visit.

## 2025-05-08 ENCOUNTER — PATIENT MESSAGE (OUTPATIENT)
Dept: NEUROLOGY | Facility: CLINIC | Age: 70
End: 2025-05-08
Payer: MEDICARE

## 2025-06-11 ENCOUNTER — PROCEDURE VISIT (OUTPATIENT)
Dept: NEUROLOGY | Facility: CLINIC | Age: 70
End: 2025-06-11
Payer: MEDICARE

## 2025-06-11 DIAGNOSIS — R20.0 BILATERAL LEG NUMBNESS: ICD-10-CM

## 2025-06-11 PROCEDURE — 95910 NRV CNDJ TEST 7-8 STUDIES: CPT | Mod: PBBFAC,PN | Performed by: PSYCHIATRY & NEUROLOGY

## 2025-06-11 PROCEDURE — 95886 MUSC TEST DONE W/N TEST COMP: CPT | Mod: PBBFAC,PN | Performed by: PSYCHIATRY & NEUROLOGY

## 2025-06-11 PROCEDURE — 99213 OFFICE O/P EST LOW 20 MIN: CPT | Mod: S$PBB,,, | Performed by: PSYCHIATRY & NEUROLOGY

## 2025-06-11 PROCEDURE — 95910 NRV CNDJ TEST 7-8 STUDIES: CPT | Mod: 26,S$PBB,, | Performed by: PSYCHIATRY & NEUROLOGY

## 2025-06-11 PROCEDURE — 95886 MUSC TEST DONE W/N TEST COMP: CPT | Mod: 26,S$PBB,, | Performed by: PSYCHIATRY & NEUROLOGY

## 2025-06-11 NOTE — PROCEDURES
EMG W/ ULTRASOUND AND NERVE CONDUCTION TEST 2 Extremities    Date/Time: 6/11/2025 2:00 PM    Performed by: Maurice Gonsalves MD  Authorized by: Jennifer Brownlee MD                                                                 [facility] [address]     Subjective:       Patient ID: Aly Brar is a 69 y.o. male here for a EMG focused evaluation for leg pain and numbness. Previous visits and diagnostic evaluation has been reviewed.  Patient has a history of polio which affected both legs at the age of 1.  He states the left leg was more severely affected and has had multiple surgeries involving the left foot and leg.  He states he was told he has post-polio syndrome by several neurologists more recently.  He states for the past few years he has episodes which occur after prolonged standing or walking which involve a numbness and heaviness of bilateral legs.  He denies severe back pain but states he has been told he has lumbar stenosis.  He states his urinary incontinence has gotten worse recently.  HPI  Review of patient's allergies indicates:   Allergen Reactions    Cefazolin Swelling    Amoxicillin     Bactrim [sulfamethoxazole-trimethoprim]     Sulfa (sulfonamide antibiotics)       There were no vitals filed for this visit.   Chief Complaint: No chief complaint on file.    History reviewed. No pertinent past medical history.   Social History     Socioeconomic History    Marital status: Single   Tobacco Use    Smoking status: Never    Smokeless tobacco: Never   Substance and Sexual Activity    Alcohol use: Not Currently    Drug use: Never    Sexual activity: Not Currently     Social Drivers of Health     Financial Resource Strain: Low Risk  (7/22/2024)    Overall Financial Resource Strain (CARDIA)     Difficulty of Paying Living Expenses: Not hard at all   Food Insecurity: No Food Insecurity (7/22/2024)    Hunger Vital Sign     Worried About Running Out of Food in the Last Year: Never true     Ran Out of Food in the  Last Year: Never true   Physical Activity: Unknown (7/22/2024)    Exercise Vital Sign     Days of Exercise per Week: 3 days   Stress: No Stress Concern Present (7/22/2024)    [place] [employer] [place] [employer] - Occupational Stress Questionnaire     Feeling of Stress : Not at all   Housing Stability: Unknown (7/22/2024)    Housing Stability Vital Sign     Unable to Pay for Housing in the Last Year: No            Objective:      Physical Exam    Constitutional: Patient appears well-nourished.   Head: Normocephalic and atraumatic.   Mouth/Throat: Oropharynx is clear and moist.   Pulmonary/Chest: Effort normal.   Abdominal: Soft.   Skin: Skin is warm and dry.      General:  Patient is alert and cooperative.  Affect:  Patient is appropriate to surroundings and environment.  Language:  Speech is fluent.  HEENT:  There are no outward signs of trauma to head or face.  Cranial Nerves:  Pupils are equal round and reactive to light. Extra-ocular movements are intact. Face, tongue, and palate are  symmetrical.  Motor:   The left leg is severely atrophic below-the-knee and is much shorter than the right leg.  Reflexes:  Absent in bilateral lower extremities.  Cerebellar:  No evidence of dysmetria.  Sensory:  Intact to sensory modalities tested.  Musculoskeletal:  There is no severe tenderness to palpation and manipulation of lumbar spine region.   Assessment:       We reviewed and discussed at length results of EMG of bilateral lower extremities performed today which reveals chronic bilateral denervation consistent with a history of polio severely affecting the left lower leg.  Can not rule out  underlying lumbar radiculopathies.  Clinical correlation is therefore recommended.  These findings are available via media section of chart review.   1. Bilateral leg numbness              Plan:       We discussed treatment options at length. Recommend patient keep appointment with referring provider.         I spent a total of 35  minutes on the day of the visit. This includes face to face time and non-face to face time preparing to see the patient (eg, review of tests), obtaining and/or reviewing separately obtained history, documenting clinical information in the electronic or other health record, independently interpreting results and communicating results to the patient/family/caregiver, or care coordinator.    Maurice Gonsalves MD, FAAN   06/11/2025   2:57 PM       A dictation device was used to produce this document. Use of such devices sometimes results in grammatical errors or replacement of words that sound similarly.

## 2025-08-08 ENCOUNTER — TELEPHONE (OUTPATIENT)
Dept: NEUROLOGY | Facility: CLINIC | Age: 70
End: 2025-08-08
Payer: MEDICARE